# Patient Record
Sex: FEMALE | Race: NATIVE HAWAIIAN OR OTHER PACIFIC ISLANDER | NOT HISPANIC OR LATINO | Employment: OTHER | URBAN - METROPOLITAN AREA
[De-identification: names, ages, dates, MRNs, and addresses within clinical notes are randomized per-mention and may not be internally consistent; named-entity substitution may affect disease eponyms.]

---

## 2021-03-16 ENCOUNTER — OFFICE VISIT (OUTPATIENT)
Dept: FAMILY MEDICINE | Facility: CLINIC | Age: 66
End: 2021-03-16
Payer: COMMERCIAL

## 2021-03-16 VITALS
SYSTOLIC BLOOD PRESSURE: 136 MMHG | DIASTOLIC BLOOD PRESSURE: 90 MMHG | WEIGHT: 184.19 LBS | RESPIRATION RATE: 18 BRPM | HEIGHT: 63 IN | OXYGEN SATURATION: 97 % | BODY MASS INDEX: 32.64 KG/M2 | HEART RATE: 66 BPM

## 2021-03-16 DIAGNOSIS — Z11.4 ENCOUNTER FOR SCREENING FOR HIV: ICD-10-CM

## 2021-03-16 DIAGNOSIS — Z11.59 NEED FOR HEPATITIS C SCREENING TEST: ICD-10-CM

## 2021-03-16 DIAGNOSIS — L93.0 DISCOID LUPUS ERYTHEMATOSUS: ICD-10-CM

## 2021-03-16 DIAGNOSIS — E11.9 TYPE 2 DIABETES MELLITUS WITHOUT COMPLICATION, WITHOUT LONG-TERM CURRENT USE OF INSULIN: Primary | ICD-10-CM

## 2021-03-16 DIAGNOSIS — F51.01 PRIMARY INSOMNIA: ICD-10-CM

## 2021-03-16 DIAGNOSIS — N39.3 STRESS INCONTINENCE: ICD-10-CM

## 2021-03-16 PROCEDURE — 99204 OFFICE O/P NEW MOD 45 MIN: CPT | Mod: S$PBB,,, | Performed by: INTERNAL MEDICINE

## 2021-03-16 PROCEDURE — 99204 PR OFFICE/OUTPT VISIT, NEW, LEVL IV, 45-59 MIN: ICD-10-PCS | Mod: S$PBB,,, | Performed by: INTERNAL MEDICINE

## 2021-03-16 RX ORDER — METFORMIN HYDROCHLORIDE 500 MG/5ML
5 SOLUTION ORAL 2 TIMES DAILY
COMMUNITY
End: 2021-03-16 | Stop reason: SDUPTHER

## 2021-03-16 RX ORDER — CLOBETASOL PROPIONATE 0.5 MG/G
OINTMENT TOPICAL 2 TIMES DAILY
COMMUNITY
End: 2021-03-16 | Stop reason: SDUPTHER

## 2021-03-16 RX ORDER — METFORMIN HYDROCHLORIDE 500 MG/5ML
5 SOLUTION ORAL 2 TIMES DAILY
Qty: 900 ML | Refills: 3 | Status: SHIPPED | OUTPATIENT
Start: 2021-03-16 | End: 2021-03-26 | Stop reason: SDUPTHER

## 2021-03-16 RX ORDER — METFORMIN HYDROCHLORIDE 500 MG/5ML
5 SOLUTION ORAL 2 TIMES DAILY
Qty: 300 ML | Refills: 5 | Status: SHIPPED | OUTPATIENT
Start: 2021-03-16 | End: 2021-03-16 | Stop reason: SDUPTHER

## 2021-03-16 RX ORDER — CLOBETASOL PROPIONATE 0.5 MG/G
OINTMENT TOPICAL 2 TIMES DAILY
Qty: 60 G | Refills: 1 | Status: SHIPPED | OUTPATIENT
Start: 2021-03-16 | End: 2021-09-21

## 2021-03-24 LAB
ALBUMIN SERPL-MCNC: 4.8 G/DL (ref 3.8–4.8)
ALBUMIN/GLOB SERPL: 1.8 {RATIO} (ref 1.2–2.2)
ALP SERPL-CCNC: 86 IU/L (ref 39–117)
ALT SERPL-CCNC: 27 IU/L (ref 0–32)
ANA TITR SER IF: NEGATIVE {TITER}
AST SERPL-CCNC: 19 IU/L (ref 0–40)
BASOPHILS # BLD AUTO: 0 X10E3/UL (ref 0–0.2)
BASOPHILS NFR BLD AUTO: 1 %
BILIRUB SERPL-MCNC: 0.4 MG/DL (ref 0–1.2)
BUN SERPL-MCNC: 12 MG/DL (ref 8–27)
BUN/CREAT SERPL: 14 (ref 12–28)
C3 SERPL-MCNC: 180 MG/DL (ref 82–167)
C4 SERPL-MCNC: 35 MG/DL (ref 12–38)
CALCIUM SERPL-MCNC: 10.1 MG/DL (ref 8.7–10.3)
CENTROMERE B AB SER-ACNC: <0.2 AI (ref 0–0.9)
CHLORIDE SERPL-SCNC: 100 MMOL/L (ref 96–106)
CHOLEST SERPL-MCNC: 224 MG/DL (ref 100–199)
CHROMATIN AB SERPL-ACNC: <0.2 AI (ref 0–0.9)
CO2 SERPL-SCNC: 21 MMOL/L (ref 20–29)
CREAT SERPL-MCNC: 0.88 MG/DL (ref 0.57–1)
DSDNA AB SER-ACNC: <1 IU/ML (ref 0–9)
ENA JO1 AB SER-ACNC: <0.2 AI (ref 0–0.9)
ENA RNP AB SER-ACNC: <0.2 AI (ref 0–0.9)
ENA SCL70 AB SER-ACNC: <0.2 AI (ref 0–0.9)
ENA SM AB SER-ACNC: <0.2 AI (ref 0–0.9)
ENA SS-A AB SER-ACNC: <0.2 AI (ref 0–0.9)
ENA SS-B AB SER-ACNC: <0.2 AI (ref 0–0.9)
EOSINOPHIL # BLD AUTO: 0.2 X10E3/UL (ref 0–0.4)
EOSINOPHIL NFR BLD AUTO: 3 %
ERYTHROCYTE [DISTWIDTH] IN BLOOD BY AUTOMATED COUNT: 12.1 % (ref 11.7–15.4)
ERYTHROCYTE [SEDIMENTATION RATE] IN BLOOD BY WESTERGREN METHOD: 49 MM/HR (ref 0–40)
GLOBULIN SER CALC-MCNC: 2.7 G/DL (ref 1.5–4.5)
GLUCOSE SERPL-MCNC: 346 MG/DL (ref 65–99)
HBA1C MFR BLD: 13.3 % (ref 4.8–5.6)
HCT VFR BLD AUTO: 49 % (ref 34–46.6)
HCV AB S/CO SERPL IA: <0.1 S/CO RATIO (ref 0–0.9)
HDLC SERPL-MCNC: 46 MG/DL
HGB BLD-MCNC: 15.5 G/DL (ref 11.1–15.9)
HIV 1+2 AB+HIV1 P24 AG SERPL QL IA: NON REACTIVE
IMM GRANULOCYTES # BLD AUTO: 0 X10E3/UL (ref 0–0.1)
IMM GRANULOCYTES NFR BLD AUTO: 0 %
LABORATORY COMMENT REPORT: NORMAL
LDLC SERPL CALC-MCNC: 138 MG/DL (ref 0–99)
LYMPHOCYTES # BLD AUTO: 1.7 X10E3/UL (ref 0.7–3.1)
LYMPHOCYTES NFR BLD AUTO: 34 %
Lab: NORMAL
MCH RBC QN AUTO: 30.2 PG (ref 26.6–33)
MCHC RBC AUTO-ENTMCNC: 31.6 G/DL (ref 31.5–35.7)
MCV RBC AUTO: 95 FL (ref 79–97)
MONOCYTES # BLD AUTO: 0.4 X10E3/UL (ref 0.1–0.9)
MONOCYTES NFR BLD AUTO: 9 %
NEUTROPHILS # BLD AUTO: 2.7 X10E3/UL (ref 1.4–7)
NEUTROPHILS NFR BLD AUTO: 53 %
PLATELET # BLD AUTO: 214 X10E3/UL (ref 150–450)
POTASSIUM SERPL-SCNC: 4.1 MMOL/L (ref 3.5–5.2)
PROT SERPL-MCNC: 7.5 G/DL (ref 6–8.5)
RBC # BLD AUTO: 5.14 X10E6/UL (ref 3.77–5.28)
SODIUM SERPL-SCNC: 138 MMOL/L (ref 134–144)
TRIGL SERPL-MCNC: 220 MG/DL (ref 0–149)
VLDLC SERPL CALC-MCNC: 40 MG/DL (ref 5–40)
WBC # BLD AUTO: 5.1 X10E3/UL (ref 3.4–10.8)

## 2021-03-25 ENCOUNTER — TELEPHONE (OUTPATIENT)
Dept: PEDIATRICS | Facility: CLINIC | Age: 66
End: 2021-03-25

## 2021-03-26 ENCOUNTER — TELEPHONE (OUTPATIENT)
Dept: DIABETES | Facility: CLINIC | Age: 66
End: 2021-03-26

## 2021-03-26 ENCOUNTER — OFFICE VISIT (OUTPATIENT)
Dept: FAMILY MEDICINE | Facility: CLINIC | Age: 66
End: 2021-03-26
Payer: OTHER GOVERNMENT

## 2021-03-26 VITALS
HEART RATE: 90 BPM | OXYGEN SATURATION: 99 % | SYSTOLIC BLOOD PRESSURE: 132 MMHG | RESPIRATION RATE: 18 BRPM | WEIGHT: 186.19 LBS | DIASTOLIC BLOOD PRESSURE: 70 MMHG | BODY MASS INDEX: 32.99 KG/M2 | HEIGHT: 63 IN

## 2021-03-26 DIAGNOSIS — E11.9 TYPE 2 DIABETES MELLITUS WITHOUT COMPLICATION, WITHOUT LONG-TERM CURRENT USE OF INSULIN: Primary | ICD-10-CM

## 2021-03-26 DIAGNOSIS — E78.2 MIXED HYPERLIPIDEMIA: ICD-10-CM

## 2021-03-26 DIAGNOSIS — R13.10 DYSPHAGIA, UNSPECIFIED TYPE: ICD-10-CM

## 2021-03-26 DIAGNOSIS — T80.69XA ALLERGIC REACTION TO VACCINE: ICD-10-CM

## 2021-03-26 DIAGNOSIS — L93.0 DISCOID LUPUS ERYTHEMATOSUS: ICD-10-CM

## 2021-03-26 PROCEDURE — 99214 OFFICE O/P EST MOD 30 MIN: CPT | Mod: S$PBB,,, | Performed by: INTERNAL MEDICINE

## 2021-03-26 PROCEDURE — 99215 OFFICE O/P EST HI 40 MIN: CPT | Performed by: INTERNAL MEDICINE

## 2021-03-26 PROCEDURE — 99214 PR OFFICE/OUTPT VISIT, EST, LEVL IV, 30-39 MIN: ICD-10-PCS | Mod: S$PBB,,, | Performed by: INTERNAL MEDICINE

## 2021-03-26 RX ORDER — METFORMIN HYDROCHLORIDE 500 MG/5ML
10 SOLUTION ORAL 2 TIMES DAILY
Qty: 600 ML | Refills: 11 | Status: SHIPPED | OUTPATIENT
Start: 2021-03-26 | End: 2021-04-25

## 2021-03-26 RX ORDER — GLIPIZIDE 10 MG/1
10 TABLET, FILM COATED, EXTENDED RELEASE ORAL
Qty: 90 TABLET | Refills: 3 | Status: SHIPPED | OUTPATIENT
Start: 2021-03-26 | End: 2021-09-21 | Stop reason: SDUPTHER

## 2021-03-26 RX ORDER — ACETAMINOPHEN 500 MG
TABLET ORAL
COMMUNITY
Start: 2021-03-19

## 2021-04-12 ENCOUNTER — OFFICE VISIT (OUTPATIENT)
Dept: DERMATOLOGY | Facility: CLINIC | Age: 66
End: 2021-04-12
Payer: OTHER GOVERNMENT

## 2021-04-12 ENCOUNTER — TELEPHONE (OUTPATIENT)
Dept: FAMILY MEDICINE | Facility: CLINIC | Age: 66
End: 2021-04-12

## 2021-04-12 DIAGNOSIS — E11.9 TYPE 2 DIABETES MELLITUS WITHOUT COMPLICATION, WITHOUT LONG-TERM CURRENT USE OF INSULIN: Primary | ICD-10-CM

## 2021-04-12 DIAGNOSIS — L60.3 NAIL DYSTROPHY: Primary | ICD-10-CM

## 2021-04-12 DIAGNOSIS — L29.9 PRURITUS: ICD-10-CM

## 2021-04-12 PROCEDURE — 87101 SKIN FUNGI CULTURE: CPT | Performed by: STUDENT IN AN ORGANIZED HEALTH CARE EDUCATION/TRAINING PROGRAM

## 2021-04-12 PROCEDURE — 99999 PR PBB SHADOW E&M-EST. PATIENT-LVL III: CPT | Mod: PBBFAC,,, | Performed by: STUDENT IN AN ORGANIZED HEALTH CARE EDUCATION/TRAINING PROGRAM

## 2021-04-12 PROCEDURE — 99204 PR OFFICE/OUTPT VISIT, NEW, LEVL IV, 45-59 MIN: ICD-10-PCS | Mod: S$PBB,,, | Performed by: STUDENT IN AN ORGANIZED HEALTH CARE EDUCATION/TRAINING PROGRAM

## 2021-04-12 PROCEDURE — 99213 OFFICE O/P EST LOW 20 MIN: CPT | Mod: PBBFAC,PO | Performed by: STUDENT IN AN ORGANIZED HEALTH CARE EDUCATION/TRAINING PROGRAM

## 2021-04-12 PROCEDURE — 99999 PR PBB SHADOW E&M-EST. PATIENT-LVL III: ICD-10-PCS | Mod: PBBFAC,,, | Performed by: STUDENT IN AN ORGANIZED HEALTH CARE EDUCATION/TRAINING PROGRAM

## 2021-04-12 PROCEDURE — 99204 OFFICE O/P NEW MOD 45 MIN: CPT | Mod: S$PBB,,, | Performed by: STUDENT IN AN ORGANIZED HEALTH CARE EDUCATION/TRAINING PROGRAM

## 2021-04-12 PROCEDURE — 87107 FUNGI IDENTIFICATION MOLD: CPT | Performed by: STUDENT IN AN ORGANIZED HEALTH CARE EDUCATION/TRAINING PROGRAM

## 2021-04-12 RX ORDER — UREA 40 %
CREAM (GRAM) TOPICAL 2 TIMES DAILY
Qty: 198 G | Refills: 2 | Status: SHIPPED | OUTPATIENT
Start: 2021-04-12 | End: 2021-09-21

## 2021-04-12 RX ORDER — METFORMIN HYDROCHLORIDE 1000 MG/1
1000 TABLET ORAL 2 TIMES DAILY WITH MEALS
Qty: 180 TABLET | Refills: 3 | Status: SHIPPED | OUTPATIENT
Start: 2021-04-12 | End: 2021-09-21 | Stop reason: ALTCHOICE

## 2021-04-14 ENCOUNTER — CLINICAL SUPPORT (OUTPATIENT)
Dept: DIABETES | Facility: CLINIC | Age: 66
End: 2021-04-14
Payer: OTHER GOVERNMENT

## 2021-04-14 VITALS — BODY MASS INDEX: 32.96 KG/M2 | HEIGHT: 63 IN | WEIGHT: 186 LBS

## 2021-04-14 DIAGNOSIS — E11.9 TYPE 2 DIABETES MELLITUS WITHOUT COMPLICATION, WITHOUT LONG-TERM CURRENT USE OF INSULIN: ICD-10-CM

## 2021-04-14 PROCEDURE — 99212 OFFICE O/P EST SF 10 MIN: CPT | Mod: PBBFAC,PO | Performed by: DIETITIAN, REGISTERED

## 2021-04-14 PROCEDURE — 99999 PR PBB SHADOW E&M-EST. PATIENT-LVL II: CPT | Mod: PBBFAC,,, | Performed by: DIETITIAN, REGISTERED

## 2021-04-14 PROCEDURE — 99999 PR PBB SHADOW E&M-EST. PATIENT-LVL II: ICD-10-PCS | Mod: PBBFAC,,, | Performed by: DIETITIAN, REGISTERED

## 2021-04-14 PROCEDURE — G0108 DIAB MANAGE TRN  PER INDIV: HCPCS | Mod: PBBFAC,PO | Performed by: DIETITIAN, REGISTERED

## 2021-04-19 ENCOUNTER — OFFICE VISIT (OUTPATIENT)
Dept: PODIATRY | Facility: CLINIC | Age: 66
End: 2021-04-19
Payer: OTHER GOVERNMENT

## 2021-04-19 VITALS
WEIGHT: 186 LBS | BODY MASS INDEX: 32.96 KG/M2 | DIASTOLIC BLOOD PRESSURE: 83 MMHG | HEART RATE: 70 BPM | HEIGHT: 63 IN | SYSTOLIC BLOOD PRESSURE: 125 MMHG

## 2021-04-19 DIAGNOSIS — E11.9 TYPE 2 DIABETES MELLITUS WITHOUT COMPLICATION, WITHOUT LONG-TERM CURRENT USE OF INSULIN: Primary | ICD-10-CM

## 2021-04-19 DIAGNOSIS — B35.3 TINEA PEDIS OF BOTH FEET: ICD-10-CM

## 2021-04-19 DIAGNOSIS — B35.1 ONYCHOMYCOSIS DUE TO DERMATOPHYTE: ICD-10-CM

## 2021-04-19 DIAGNOSIS — L60.3 NAIL DYSTROPHY: ICD-10-CM

## 2021-04-19 PROCEDURE — 99203 PR OFFICE/OUTPT VISIT, NEW, LEVL III, 30-44 MIN: ICD-10-PCS | Mod: S$GLB,,, | Performed by: PODIATRIST

## 2021-04-19 PROCEDURE — 99203 OFFICE O/P NEW LOW 30 MIN: CPT | Mod: S$GLB,,, | Performed by: PODIATRIST

## 2021-04-19 RX ORDER — PANTOPRAZOLE SODIUM 40 MG/1
FOR SUSPENSION ORAL
COMMUNITY
Start: 2021-04-16 | End: 2021-09-21

## 2021-04-19 RX ORDER — CLOTRIMAZOLE AND BETAMETHASONE DIPROPIONATE 10; .64 MG/G; MG/G
CREAM TOPICAL 2 TIMES DAILY
Qty: 1 TUBE | Refills: 2 | Status: SHIPPED | OUTPATIENT
Start: 2021-04-19 | End: 2021-09-21

## 2021-05-04 ENCOUNTER — OFFICE VISIT (OUTPATIENT)
Dept: FAMILY MEDICINE | Facility: CLINIC | Age: 66
End: 2021-05-04
Payer: OTHER GOVERNMENT

## 2021-05-04 VITALS
SYSTOLIC BLOOD PRESSURE: 130 MMHG | WEIGHT: 189 LBS | HEIGHT: 63 IN | HEART RATE: 75 BPM | OXYGEN SATURATION: 98 % | BODY MASS INDEX: 33.49 KG/M2 | DIASTOLIC BLOOD PRESSURE: 80 MMHG

## 2021-05-04 DIAGNOSIS — Z12.31 ENCOUNTER FOR SCREENING MAMMOGRAM FOR BREAST CANCER: ICD-10-CM

## 2021-05-04 DIAGNOSIS — R13.10 DYSPHAGIA, UNSPECIFIED TYPE: ICD-10-CM

## 2021-05-04 DIAGNOSIS — E11.9 TYPE 2 DIABETES MELLITUS WITHOUT COMPLICATION, WITHOUT LONG-TERM CURRENT USE OF INSULIN: Primary | ICD-10-CM

## 2021-05-04 PROCEDURE — 99214 PR OFFICE/OUTPT VISIT, EST, LEVL IV, 30-39 MIN: ICD-10-PCS | Mod: S$PBB,,, | Performed by: INTERNAL MEDICINE

## 2021-05-04 PROCEDURE — 99215 OFFICE O/P EST HI 40 MIN: CPT | Performed by: INTERNAL MEDICINE

## 2021-05-04 PROCEDURE — 99214 OFFICE O/P EST MOD 30 MIN: CPT | Mod: S$PBB,,, | Performed by: INTERNAL MEDICINE

## 2021-05-10 ENCOUNTER — HOSPITAL ENCOUNTER (OUTPATIENT)
Dept: RADIOLOGY | Facility: HOSPITAL | Age: 66
Discharge: HOME | End: 2021-05-10
Attending: INTERNAL MEDICINE
Payer: OTHER GOVERNMENT

## 2021-05-10 DIAGNOSIS — Z12.31 ENCOUNTER FOR SCREENING MAMMOGRAM FOR BREAST CANCER: ICD-10-CM

## 2021-05-10 PROCEDURE — 77067 SCR MAMMO BI INCL CAD: CPT | Mod: TC,PO

## 2021-05-11 ENCOUNTER — TELEPHONE (OUTPATIENT)
Dept: DERMATOLOGY | Facility: CLINIC | Age: 66
End: 2021-05-11

## 2021-05-11 LAB — FUNGUS BLD CULT: NORMAL

## 2021-05-12 ENCOUNTER — TELEPHONE (OUTPATIENT)
Dept: FAMILY MEDICINE | Facility: CLINIC | Age: 66
End: 2021-05-12

## 2021-06-18 ENCOUNTER — OFFICE VISIT (OUTPATIENT)
Dept: FAMILY MEDICINE | Facility: CLINIC | Age: 66
End: 2021-06-18
Payer: OTHER GOVERNMENT

## 2021-06-18 VITALS
BODY MASS INDEX: 33.75 KG/M2 | DIASTOLIC BLOOD PRESSURE: 78 MMHG | HEART RATE: 80 BPM | OXYGEN SATURATION: 96 % | SYSTOLIC BLOOD PRESSURE: 126 MMHG | WEIGHT: 190.5 LBS

## 2021-06-18 DIAGNOSIS — E11.9 TYPE 2 DIABETES MELLITUS WITHOUT COMPLICATION, WITHOUT LONG-TERM CURRENT USE OF INSULIN: Primary | ICD-10-CM

## 2021-06-18 DIAGNOSIS — R09.82 POST-NASAL DRIP: ICD-10-CM

## 2021-06-18 DIAGNOSIS — G47.30 SLEEP APNEA, UNSPECIFIED TYPE: ICD-10-CM

## 2021-06-18 LAB — HBA1C MFR BLD: 8.4 %

## 2021-06-18 PROCEDURE — 99214 PR OFFICE/OUTPT VISIT, EST, LEVL IV, 30-39 MIN: ICD-10-PCS | Mod: S$PBB,,, | Performed by: INTERNAL MEDICINE

## 2021-06-18 PROCEDURE — 99214 OFFICE O/P EST MOD 30 MIN: CPT | Mod: S$PBB,,, | Performed by: INTERNAL MEDICINE

## 2021-06-18 PROCEDURE — 83036 HEMOGLOBIN GLYCOSYLATED A1C: CPT | Mod: PBBFAC | Performed by: INTERNAL MEDICINE

## 2021-06-18 PROCEDURE — 99214 OFFICE O/P EST MOD 30 MIN: CPT | Performed by: INTERNAL MEDICINE

## 2021-06-18 RX ORDER — IBUPROFEN 200 MG
200 TABLET ORAL EVERY 6 HOURS PRN
COMMUNITY
End: 2021-09-21

## 2021-06-18 RX ORDER — FLUTICASONE PROPIONATE 50 MCG
1 SPRAY, SUSPENSION (ML) NASAL DAILY
Qty: 48 G | Refills: 3 | Status: SHIPPED | OUTPATIENT
Start: 2021-06-18 | End: 2021-09-21

## 2021-07-16 ENCOUNTER — OFFICE VISIT (OUTPATIENT)
Dept: PULMONOLOGY | Facility: CLINIC | Age: 66
End: 2021-07-16
Payer: OTHER GOVERNMENT

## 2021-07-16 VITALS
OXYGEN SATURATION: 99 % | SYSTOLIC BLOOD PRESSURE: 124 MMHG | BODY MASS INDEX: 33.66 KG/M2 | DIASTOLIC BLOOD PRESSURE: 79 MMHG | HEART RATE: 76 BPM | HEIGHT: 63 IN | WEIGHT: 190 LBS

## 2021-07-16 DIAGNOSIS — J30.9 ALLERGIC RHINITIS, UNSPECIFIED SEASONALITY, UNSPECIFIED TRIGGER: ICD-10-CM

## 2021-07-16 DIAGNOSIS — G47.30 SLEEP APNEA, UNSPECIFIED TYPE: ICD-10-CM

## 2021-07-16 DIAGNOSIS — R53.82 CHRONIC FATIGUE: Primary | ICD-10-CM

## 2021-07-16 PROCEDURE — 99214 PR OFFICE/OUTPT VISIT, EST, LEVL IV, 30-39 MIN: ICD-10-PCS | Mod: S$GLB,,, | Performed by: NURSE PRACTITIONER

## 2021-07-16 PROCEDURE — 99214 OFFICE O/P EST MOD 30 MIN: CPT | Mod: S$GLB,,, | Performed by: NURSE PRACTITIONER

## 2021-08-12 ENCOUNTER — PROCEDURE VISIT (OUTPATIENT)
Dept: SLEEP MEDICINE | Facility: HOSPITAL | Age: 66
End: 2021-08-12
Payer: OTHER GOVERNMENT

## 2021-08-12 DIAGNOSIS — G47.30 SLEEP APNEA, UNSPECIFIED TYPE: ICD-10-CM

## 2021-08-12 DIAGNOSIS — R53.82 CHRONIC FATIGUE: ICD-10-CM

## 2021-08-12 PROCEDURE — 95810 POLYSOM 6/> YRS 4/> PARAM: CPT

## 2021-08-17 ENCOUNTER — TELEPHONE (OUTPATIENT)
Dept: PULMONOLOGY | Facility: CLINIC | Age: 66
End: 2021-08-17

## 2021-08-17 DIAGNOSIS — G47.33 OSA (OBSTRUCTIVE SLEEP APNEA): Primary | ICD-10-CM

## 2021-09-21 ENCOUNTER — OFFICE VISIT (OUTPATIENT)
Dept: FAMILY MEDICINE | Facility: CLINIC | Age: 66
End: 2021-09-21
Payer: OTHER GOVERNMENT

## 2021-09-21 VITALS
TEMPERATURE: 98 F | HEIGHT: 63 IN | RESPIRATION RATE: 18 BRPM | WEIGHT: 195 LBS | OXYGEN SATURATION: 98 % | SYSTOLIC BLOOD PRESSURE: 128 MMHG | DIASTOLIC BLOOD PRESSURE: 70 MMHG | BODY MASS INDEX: 34.55 KG/M2 | HEART RATE: 74 BPM

## 2021-09-21 DIAGNOSIS — E11.9 TYPE 2 DIABETES MELLITUS WITHOUT COMPLICATION, WITHOUT LONG-TERM CURRENT USE OF INSULIN: Primary | ICD-10-CM

## 2021-09-21 DIAGNOSIS — E78.5 DYSLIPIDEMIA: ICD-10-CM

## 2021-09-21 LAB
CLARITY UR: CLEAR
COLOR UR: NORMAL
CREATINE/CREATININE: NORMAL
HBA1C MFR BLD: 7.5 %
Lab: NORMAL
POC MICROALBUMIN URINE: NEGATIVE

## 2021-09-21 PROCEDURE — 99213 OFFICE O/P EST LOW 20 MIN: CPT | Mod: S$PBB,,, | Performed by: FAMILY MEDICINE

## 2021-09-21 PROCEDURE — 99214 OFFICE O/P EST MOD 30 MIN: CPT | Performed by: FAMILY MEDICINE

## 2021-09-21 PROCEDURE — 82044 UR ALBUMIN SEMIQUANTITATIVE: CPT | Mod: PBBFAC | Performed by: FAMILY MEDICINE

## 2021-09-21 PROCEDURE — 99213 PR OFFICE/OUTPT VISIT, EST, LEVL III, 20-29 MIN: ICD-10-PCS | Mod: S$PBB,,, | Performed by: FAMILY MEDICINE

## 2021-09-21 PROCEDURE — 83036 HEMOGLOBIN GLYCOSYLATED A1C: CPT | Mod: PBBFAC | Performed by: FAMILY MEDICINE

## 2021-09-21 RX ORDER — NAPROXEN SODIUM 220 MG/1
81 TABLET, FILM COATED ORAL DAILY
Qty: 90 TABLET | Refills: 3 | Status: SHIPPED | OUTPATIENT
Start: 2021-09-21 | End: 2022-09-27

## 2021-09-21 RX ORDER — ATORVASTATIN CALCIUM 20 MG/1
40 TABLET, FILM COATED ORAL DAILY
Qty: 180 TABLET | Refills: 3 | Status: SHIPPED | OUTPATIENT
Start: 2021-09-21 | End: 2022-03-22 | Stop reason: SDUPTHER

## 2021-09-21 RX ORDER — GLIPIZIDE 10 MG/1
10 TABLET, FILM COATED, EXTENDED RELEASE ORAL
Qty: 90 TABLET | Refills: 3 | Status: SHIPPED | OUTPATIENT
Start: 2021-09-21 | End: 2022-03-22 | Stop reason: SDUPTHER

## 2021-09-24 ENCOUNTER — TELEPHONE (OUTPATIENT)
Dept: PULMONOLOGY | Facility: CLINIC | Age: 66
End: 2021-09-24

## 2021-10-21 ENCOUNTER — OFFICE VISIT (OUTPATIENT)
Dept: FAMILY MEDICINE | Facility: CLINIC | Age: 66
End: 2021-10-21
Payer: OTHER GOVERNMENT

## 2021-10-21 VITALS
BODY MASS INDEX: 34.55 KG/M2 | WEIGHT: 195 LBS | DIASTOLIC BLOOD PRESSURE: 70 MMHG | SYSTOLIC BLOOD PRESSURE: 136 MMHG | HEART RATE: 74 BPM | HEIGHT: 63 IN | RESPIRATION RATE: 18 BRPM | OXYGEN SATURATION: 97 %

## 2021-10-21 DIAGNOSIS — E78.2 MIXED HYPERLIPIDEMIA: ICD-10-CM

## 2021-10-21 DIAGNOSIS — E11.9 TYPE 2 DIABETES MELLITUS WITHOUT COMPLICATION, WITHOUT LONG-TERM CURRENT USE OF INSULIN: Primary | ICD-10-CM

## 2021-10-21 DIAGNOSIS — R21 RASH AND NONSPECIFIC SKIN ERUPTION: ICD-10-CM

## 2021-10-21 PROCEDURE — 99213 PR OFFICE/OUTPT VISIT, EST, LEVL III, 20-29 MIN: ICD-10-PCS | Mod: S$PBB,,, | Performed by: FAMILY MEDICINE

## 2021-10-21 PROCEDURE — 99213 OFFICE O/P EST LOW 20 MIN: CPT | Mod: S$PBB,,, | Performed by: FAMILY MEDICINE

## 2021-10-21 PROCEDURE — 99214 OFFICE O/P EST MOD 30 MIN: CPT | Performed by: FAMILY MEDICINE

## 2021-10-21 RX ORDER — PANTOPRAZOLE SODIUM 40 MG/1
FOR SUSPENSION ORAL
COMMUNITY
Start: 2021-10-12

## 2021-10-21 RX ORDER — UREA 40 %
CREAM (GRAM) TOPICAL
COMMUNITY
Start: 2021-09-30

## 2021-11-04 ENCOUNTER — OFFICE VISIT (OUTPATIENT)
Dept: FAMILY MEDICINE | Facility: CLINIC | Age: 66
End: 2021-11-04
Payer: OTHER GOVERNMENT

## 2021-11-04 VITALS
BODY MASS INDEX: 34.38 KG/M2 | DIASTOLIC BLOOD PRESSURE: 74 MMHG | WEIGHT: 194 LBS | SYSTOLIC BLOOD PRESSURE: 122 MMHG | HEIGHT: 63 IN | OXYGEN SATURATION: 98 % | RESPIRATION RATE: 18 BRPM | HEART RATE: 68 BPM

## 2021-11-04 DIAGNOSIS — E11.9 TYPE 2 DIABETES MELLITUS WITHOUT COMPLICATION, WITHOUT LONG-TERM CURRENT USE OF INSULIN: ICD-10-CM

## 2021-11-04 DIAGNOSIS — T78.40XD ALLERGIC REACTION, SUBSEQUENT ENCOUNTER: ICD-10-CM

## 2021-11-04 DIAGNOSIS — R21 RASH: Primary | ICD-10-CM

## 2021-11-04 PROBLEM — T78.40XA ALLERGIC REACTION: Status: ACTIVE | Noted: 2021-11-04

## 2021-11-04 PROCEDURE — 99214 OFFICE O/P EST MOD 30 MIN: CPT | Performed by: FAMILY MEDICINE

## 2021-11-04 PROCEDURE — 99213 OFFICE O/P EST LOW 20 MIN: CPT | Mod: S$PBB,,, | Performed by: FAMILY MEDICINE

## 2021-11-04 PROCEDURE — 99213 PR OFFICE/OUTPT VISIT, EST, LEVL III, 20-29 MIN: ICD-10-PCS | Mod: S$PBB,,, | Performed by: FAMILY MEDICINE

## 2021-11-04 RX ORDER — EPINEPHRINE 0.3 MG/.3ML
INJECTION SUBCUTANEOUS
Qty: 1 EACH | Refills: 2 | Status: SHIPPED | OUTPATIENT
Start: 2021-11-04 | End: 2024-01-02 | Stop reason: SDUPTHER

## 2021-12-02 ENCOUNTER — OFFICE VISIT (OUTPATIENT)
Dept: FAMILY MEDICINE | Facility: CLINIC | Age: 66
End: 2021-12-02
Payer: OTHER GOVERNMENT

## 2021-12-02 DIAGNOSIS — H65.193 ACUTE MUCOID OTITIS MEDIA OF BOTH EARS: ICD-10-CM

## 2021-12-02 DIAGNOSIS — R09.81 HEAD CONGESTION: ICD-10-CM

## 2021-12-02 DIAGNOSIS — J02.9 SORE THROAT: ICD-10-CM

## 2021-12-02 DIAGNOSIS — R51.9 ACUTE NONINTRACTABLE HEADACHE, UNSPECIFIED HEADACHE TYPE: ICD-10-CM

## 2021-12-02 DIAGNOSIS — H65.193 ACUTE MUCOID OTITIS MEDIA OF BOTH EARS: Primary | ICD-10-CM

## 2021-12-02 LAB
CTP QC/QA: YES
FLUAV AG NPH QL: NEGATIVE
FLUBV AG NPH QL: NEGATIVE
S PYO RRNA THROAT QL PROBE: NEGATIVE
SARS-COV-2 RDRP RESP QL NAA+PROBE: NEGATIVE

## 2021-12-02 PROCEDURE — 99214 PR OFFICE/OUTPT VISIT, EST, LEVL IV, 30-39 MIN: ICD-10-PCS | Mod: S$PBB,,, | Performed by: NURSE PRACTITIONER

## 2021-12-02 PROCEDURE — 99215 OFFICE O/P EST HI 40 MIN: CPT | Performed by: NURSE PRACTITIONER

## 2021-12-02 PROCEDURE — U0002 COVID-19 LAB TEST NON-CDC: HCPCS | Mod: PBBFAC | Performed by: NURSE PRACTITIONER

## 2021-12-02 PROCEDURE — 99214 OFFICE O/P EST MOD 30 MIN: CPT | Mod: S$PBB,,, | Performed by: NURSE PRACTITIONER

## 2021-12-02 PROCEDURE — 96372 THER/PROPH/DIAG INJ SC/IM: CPT | Mod: PBBFAC | Performed by: NURSE PRACTITIONER

## 2021-12-02 PROCEDURE — 87804 INFLUENZA ASSAY W/OPTIC: CPT | Mod: PBBFAC | Performed by: NURSE PRACTITIONER

## 2021-12-02 PROCEDURE — 87880 STREP A ASSAY W/OPTIC: CPT | Mod: PBBFAC | Performed by: NURSE PRACTITIONER

## 2021-12-02 RX ORDER — NAPROXEN 500 MG/1
500 TABLET ORAL 2 TIMES DAILY WITH MEALS
Qty: 40 TABLET | Refills: 1 | Status: SHIPPED | OUTPATIENT
Start: 2021-12-02 | End: 2021-12-07

## 2021-12-02 RX ORDER — FLUTICASONE PROPIONATE 50 MCG
2 SPRAY, SUSPENSION (ML) NASAL DAILY
Qty: 13 G | Refills: 1 | Status: SHIPPED | OUTPATIENT
Start: 2021-12-02 | End: 2021-12-02 | Stop reason: SDUPTHER

## 2021-12-02 RX ORDER — DEXAMETHASONE SODIUM PHOSPHATE 4 MG/ML
8 INJECTION, SOLUTION INTRA-ARTICULAR; INTRALESIONAL; INTRAMUSCULAR; INTRAVENOUS; SOFT TISSUE ONCE
Status: COMPLETED | OUTPATIENT
Start: 2021-12-02 | End: 2021-12-02

## 2021-12-02 RX ORDER — AMOXICILLIN AND CLAVULANATE POTASSIUM 875; 125 MG/1; MG/1
1 TABLET, FILM COATED ORAL EVERY 12 HOURS
Qty: 20 TABLET | Refills: 0 | Status: SHIPPED | OUTPATIENT
Start: 2021-12-02 | End: 2022-03-22

## 2021-12-02 RX ADMIN — DEXAMETHASONE SODIUM PHOSPHATE 8 MG: 4 INJECTION, SOLUTION INTRA-ARTICULAR; INTRALESIONAL; INTRAMUSCULAR; INTRAVENOUS; SOFT TISSUE at 12:12

## 2021-12-03 VITALS
DIASTOLIC BLOOD PRESSURE: 80 MMHG | HEIGHT: 63 IN | RESPIRATION RATE: 18 BRPM | SYSTOLIC BLOOD PRESSURE: 134 MMHG | OXYGEN SATURATION: 97 % | HEART RATE: 64 BPM | WEIGHT: 193.31 LBS | BODY MASS INDEX: 34.25 KG/M2 | TEMPERATURE: 98 F

## 2021-12-03 PROBLEM — H65.193 ACUTE MUCOID OTITIS MEDIA OF BOTH EARS: Status: ACTIVE | Noted: 2021-12-03

## 2021-12-03 PROBLEM — R09.81 HEAD CONGESTION: Status: ACTIVE | Noted: 2021-12-03

## 2021-12-03 PROBLEM — R51.9 ACUTE NONINTRACTABLE HEADACHE: Status: ACTIVE | Noted: 2021-12-03

## 2021-12-03 PROBLEM — H65.113 ACUTE MUCOID OTITIS MEDIA OF BOTH EARS: Status: ACTIVE | Noted: 2021-12-03

## 2021-12-03 PROBLEM — J02.9 SORE THROAT: Status: ACTIVE | Noted: 2021-12-03

## 2021-12-03 RX ORDER — FLUTICASONE PROPIONATE 50 MCG
2 SPRAY, SUSPENSION (ML) NASAL DAILY
Qty: 13 G | Refills: 1 | Status: SHIPPED | OUTPATIENT
Start: 2021-12-03 | End: 2021-12-07

## 2021-12-13 ENCOUNTER — OFFICE VISIT (OUTPATIENT)
Dept: ALLERGY | Facility: CLINIC | Age: 66
End: 2021-12-13
Payer: OTHER GOVERNMENT

## 2021-12-13 ENCOUNTER — LAB VISIT (OUTPATIENT)
Dept: LAB | Facility: HOSPITAL | Age: 66
End: 2021-12-13
Attending: ALLERGY & IMMUNOLOGY
Payer: OTHER GOVERNMENT

## 2021-12-13 VITALS
DIASTOLIC BLOOD PRESSURE: 84 MMHG | BODY MASS INDEX: 34.38 KG/M2 | HEIGHT: 63 IN | OXYGEN SATURATION: 98 % | WEIGHT: 194 LBS | HEART RATE: 88 BPM | SYSTOLIC BLOOD PRESSURE: 170 MMHG | TEMPERATURE: 98 F

## 2021-12-13 DIAGNOSIS — T78.3XXA ANGIOEDEMA, INITIAL ENCOUNTER: ICD-10-CM

## 2021-12-13 DIAGNOSIS — E11.9 TYPE 2 DIABETES MELLITUS WITHOUT COMPLICATION, WITHOUT LONG-TERM CURRENT USE OF INSULIN: ICD-10-CM

## 2021-12-13 DIAGNOSIS — R21 RASH AND NONSPECIFIC SKIN ERUPTION: ICD-10-CM

## 2021-12-13 DIAGNOSIS — R21 RASH AND NONSPECIFIC SKIN ERUPTION: Primary | ICD-10-CM

## 2021-12-13 LAB — CRP SERPL-MCNC: 0.16 MG/DL

## 2021-12-13 PROCEDURE — 86140 C-REACTIVE PROTEIN: CPT | Performed by: ALLERGY & IMMUNOLOGY

## 2021-12-13 PROCEDURE — 86003 ALLG SPEC IGE CRUDE XTRC EA: CPT | Mod: 91 | Performed by: ALLERGY & IMMUNOLOGY

## 2021-12-13 PROCEDURE — 86003 ALLG SPEC IGE CRUDE XTRC EA: CPT | Mod: 59 | Performed by: ALLERGY & IMMUNOLOGY

## 2021-12-13 PROCEDURE — 30000890 LABCORP MISCELLANEOUS TEST: Mod: 91 | Performed by: ALLERGY & IMMUNOLOGY

## 2021-12-13 PROCEDURE — 86003 ALLG SPEC IGE CRUDE XTRC EA: CPT | Performed by: ALLERGY & IMMUNOLOGY

## 2021-12-13 PROCEDURE — 99204 PR OFFICE/OUTPT VISIT, NEW, LEVL IV, 45-59 MIN: ICD-10-PCS | Mod: S$GLB,,, | Performed by: ALLERGY & IMMUNOLOGY

## 2021-12-13 PROCEDURE — 36415 COLL VENOUS BLD VENIPUNCTURE: CPT | Performed by: ALLERGY & IMMUNOLOGY

## 2021-12-13 PROCEDURE — 86332 IMMUNE COMPLEX ASSAY: CPT | Mod: 91 | Performed by: ALLERGY & IMMUNOLOGY

## 2021-12-13 PROCEDURE — 99204 OFFICE O/P NEW MOD 45 MIN: CPT | Mod: S$GLB,,, | Performed by: ALLERGY & IMMUNOLOGY

## 2021-12-13 RX ORDER — HYDROCORTISONE 25 MG/G
CREAM TOPICAL 2 TIMES DAILY
Qty: 453 G | Refills: 1 | Status: SHIPPED | OUTPATIENT
Start: 2021-12-13

## 2021-12-13 RX ORDER — CETIRIZINE HYDROCHLORIDE 10 MG/1
10 TABLET ORAL 2 TIMES DAILY
Qty: 60 TABLET | Refills: 2 | Status: SHIPPED | OUTPATIENT
Start: 2021-12-13 | End: 2022-01-24 | Stop reason: SDUPTHER

## 2021-12-13 RX ORDER — FAMOTIDINE 20 MG/1
20 TABLET, FILM COATED ORAL 2 TIMES DAILY
Qty: 60 TABLET | Refills: 3 | Status: SHIPPED | OUTPATIENT
Start: 2021-12-13 | End: 2022-01-24 | Stop reason: SDUPTHER

## 2021-12-16 LAB
LABCORP MISC TEST CODE: NORMAL
LABCORP MISC TEST NAME: NORMAL
LABCORP MISCELLANEOUS TEST: NORMAL
SOYBEAN IGE QN: <0.1 KU/L
WHEAT IGE QN: <0.1 KU/L
WHEAT IGE QN: <0.1 KU/L

## 2021-12-18 LAB
LABCORP MISC TEST CODE: NORMAL
LABCORP MISC TEST CODE: NORMAL
LABCORP MISC TEST NAME: NORMAL
LABCORP MISC TEST NAME: NORMAL
LABCORP MISCELLANEOUS TEST: NORMAL
LABCORP MISCELLANEOUS TEST: NORMAL

## 2021-12-23 ENCOUNTER — OFFICE VISIT (OUTPATIENT)
Dept: FAMILY MEDICINE | Facility: CLINIC | Age: 66
End: 2021-12-23
Payer: OTHER GOVERNMENT

## 2021-12-23 VITALS
DIASTOLIC BLOOD PRESSURE: 80 MMHG | SYSTOLIC BLOOD PRESSURE: 130 MMHG | RESPIRATION RATE: 18 BRPM | OXYGEN SATURATION: 98 % | BODY MASS INDEX: 34.02 KG/M2 | HEIGHT: 63 IN | HEART RATE: 88 BPM | WEIGHT: 192 LBS

## 2021-12-23 DIAGNOSIS — T78.3XXD ANGIOEDEMA, SUBSEQUENT ENCOUNTER: ICD-10-CM

## 2021-12-23 DIAGNOSIS — E11.9 TYPE 2 DIABETES MELLITUS WITHOUT COMPLICATION, WITHOUT LONG-TERM CURRENT USE OF INSULIN: Primary | ICD-10-CM

## 2021-12-23 DIAGNOSIS — E78.2 MIXED HYPERLIPIDEMIA: ICD-10-CM

## 2021-12-23 LAB — HBA1C MFR BLD: 10 %

## 2021-12-23 PROCEDURE — 99213 PR OFFICE/OUTPT VISIT, EST, LEVL III, 20-29 MIN: ICD-10-PCS | Mod: S$PBB,,, | Performed by: FAMILY MEDICINE

## 2021-12-23 PROCEDURE — 99215 OFFICE O/P EST HI 40 MIN: CPT | Performed by: FAMILY MEDICINE

## 2021-12-23 PROCEDURE — 99213 OFFICE O/P EST LOW 20 MIN: CPT | Mod: S$PBB,,, | Performed by: FAMILY MEDICINE

## 2021-12-23 PROCEDURE — 83036 HEMOGLOBIN GLYCOSYLATED A1C: CPT | Mod: PBBFAC | Performed by: FAMILY MEDICINE

## 2021-12-23 RX ORDER — SEMAGLUTIDE 1.34 MG/ML
0.5 INJECTION, SOLUTION SUBCUTANEOUS
Qty: 3 PEN | Refills: 3 | Status: SHIPPED | OUTPATIENT
Start: 2021-12-23 | End: 2022-03-15 | Stop reason: SDUPTHER

## 2021-12-28 ENCOUNTER — TELEPHONE (OUTPATIENT)
Dept: FAMILY MEDICINE | Facility: CLINIC | Age: 66
End: 2021-12-28
Payer: COMMERCIAL

## 2022-01-24 ENCOUNTER — OFFICE VISIT (OUTPATIENT)
Dept: ALLERGY | Facility: CLINIC | Age: 67
End: 2022-01-24
Payer: OTHER GOVERNMENT

## 2022-01-24 VITALS
OXYGEN SATURATION: 98 % | WEIGHT: 188 LBS | BODY MASS INDEX: 33.31 KG/M2 | SYSTOLIC BLOOD PRESSURE: 126 MMHG | HEIGHT: 63 IN | TEMPERATURE: 97 F | DIASTOLIC BLOOD PRESSURE: 78 MMHG | HEART RATE: 72 BPM

## 2022-01-24 DIAGNOSIS — T78.3XXA ANGIOEDEMA, INITIAL ENCOUNTER: ICD-10-CM

## 2022-01-24 DIAGNOSIS — L29.9 PRURITUS: ICD-10-CM

## 2022-01-24 DIAGNOSIS — E11.9 TYPE 2 DIABETES MELLITUS WITHOUT COMPLICATION, WITHOUT LONG-TERM CURRENT USE OF INSULIN: ICD-10-CM

## 2022-01-24 DIAGNOSIS — R21 RASH AND NONSPECIFIC SKIN ERUPTION: Primary | ICD-10-CM

## 2022-01-24 PROCEDURE — 99214 OFFICE O/P EST MOD 30 MIN: CPT | Mod: S$GLB,,, | Performed by: ALLERGY & IMMUNOLOGY

## 2022-01-24 PROCEDURE — 99214 PR OFFICE/OUTPT VISIT, EST, LEVL IV, 30-39 MIN: ICD-10-PCS | Mod: S$GLB,,, | Performed by: ALLERGY & IMMUNOLOGY

## 2022-01-24 RX ORDER — FAMOTIDINE 20 MG/1
20 TABLET, FILM COATED ORAL 2 TIMES DAILY
Qty: 180 TABLET | Refills: 3 | Status: SHIPPED | OUTPATIENT
Start: 2022-01-24 | End: 2022-11-11 | Stop reason: SDUPTHER

## 2022-01-24 RX ORDER — CAMPHOR AND MENTHOL 5; 5 MG/ML; MG/ML
LOTION TOPICAL
Qty: 222 ML | Refills: 3 | Status: SHIPPED | OUTPATIENT
Start: 2022-01-24

## 2022-01-24 RX ORDER — CETIRIZINE HYDROCHLORIDE 10 MG/1
10 TABLET ORAL 2 TIMES DAILY
Qty: 180 TABLET | Refills: 3 | Status: SHIPPED | OUTPATIENT
Start: 2022-01-24 | End: 2022-11-11 | Stop reason: SDUPTHER

## 2022-01-24 NOTE — PATIENT INSTRUCTIONS
Look up emmanuelle, cashew urschiol similarity to poison ivy.     Continue to take the cetirizine 1 pill twice per day     You may consider taking 2 pills twice per day on days of spicy foods and shellfish.     You can take a max of zyrtec or cetirizine 4 pills per day.     In addition to 2 pills of pepcid or famotidine per day.       Follow up 6 months , sooner if needed.

## 2022-01-24 NOTE — PROGRESS NOTES
"Subjective:       Patient ID: Lidya Benoit is a 66 y.o. female.    Chief Complaint: Angioedema (No incidents since last visit, had labs done. Yesterday after eating vazquez felt some tingling, but not as bad as before. )    HPI     Pt presents today for rash.       Her rash is improved.     However, when she made spicy chicken vazquez- relates the "heat" spicy.     Rash   Condition: improved , itching improved.   Onset 10 years ago - occurred prior to her current medication.   Again in 11/2021  Again 4 days ago   Sx: itching is improved on BID H1   erythematous rash , "whole body tingling"   Face, bilateral wrists, lateral legs, back.   "unbearable itching"   Hyperesthesia.   Spares palms and soles  Duration: few days   asso : lip swelling, facial swelling.   Tx: H1 BID.   coffee , ceravae, benadryl.  Benadryl may help but she will have a HA.   She has an epi pen but has never used it.     Augmentin given early 12/2021 for otitis media.     Pt is suspicious of foods such as soy, garlic, tomato, asian food- olga.   No one food is consistent.     Pt states she doesn't have any problems with milk or wheat.     This may be a frquency of 1 time per week.      PMH:  DM NIDDM-2019 onset.     Fhx:  Neg atopic history     12/2021   [id number]-AlQ Olga   H348-YuT Olga                <0.10                J714-GcH Garlic   N448-JwJ Garlic                <0.10                Immune Complexes, C1q Binding  <1.2             ug Eq/mL BN  (negative)    Component      Latest Ref Rng & Units 12/13/2021 12/13/2021           2:42 PM  2:42 PM   Soybean IgE      Class 0 kU/L  <0.10   Wheat IgE      Class 0 kU/L <0.10 <0.10   CRP      <0.76 mg/dL  0.16       Component      Latest Ref Rng & Units 3/23/2021   Sed Rate      0 - 40 mm/hr 49 (H)      Component      Latest Ref Rng & Units 3/23/2021   C3 Complement      82 - 167 mg/dL 180 (H)   C4 Complement      12 - 38 mg/dL 35     Component      Latest Ref Rng & Units 3/23/2021   WBC      3.4 - " "10.8 x10E3/uL 5.1   RBC      3.77 - 5.28 x10E6/uL 5.14   Hemoglobin      11.1 - 15.9 g/dL 15.5   Hematocrit      34.0 - 46.6 % 49.0 (H)   MCV      79 - 97 fL 95   MCH      26.6 - 33.0 pg 30.2   MCHC      31.5 - 35.7 g/dL 31.6   RDW      11.7 - 15.4 % 12.1   Platelets      150 - 450 x10E3/uL 214   Neutrophils      Not Estab. % 53   Lymph %      Not Estab. % 34   Mono %      Not Estab. % 9   Eosinophil %      Not Estab. % 3   Basophil %      Not Estab. % 1   Neutrophils, Abs      1.4 - 7.0 x10E3/uL 2.7   Lymph #      0.7 - 3.1 x10E3/uL 1.7   Mono #      0.1 - 0.9 x10E3/uL 0.4   Eos #      0.0 - 0.4 x10E3/uL 0.2   Baso #      0.0 - 0.2 x10E3/uL 0.0   Immature Granulocytes      Not Estab. % 0   Immature Grans (Abs)      0.0 - 0.1 x10E3/uL 0.0       Review of Systems    General: neg unexpected weight changes, fevers, chills, night sweats, malaise  HEENT: see hpi, Neg eye pain, vision changes, ear drainage, nose bleeds, throat tightness, sores in the mouth  CV: Neg chest pain, palpitations, swelling  Resp: see hpi, neg shortness of breath, hemoptysis, cough  GI: see hpi, neg dysphagia, night abdominal pain, reflux, chronic diarrhea, chronic constipation  Derm: See Hpi, neg new rash, neg flushing  Mu/sk: Neg joint pain, joint swelling   Psych: Neg anxiety  neuro: neg chronic headaches, muscle weakness  Endo: neg heat/cold intolerance, chronic fatigue    Objective:     Vitals:    01/24/22 0818   BP: 126/78   Pulse: 72   Temp: 97.2 °F (36.2 °C)   SpO2: 98%   Weight: 85.3 kg (188 lb)   Height: 5' 3" (1.6 m)        Physical Exam    General: no acute distress, well developed well nourished   HEENT:   Head:normocephalic atraumatic  Eyes: KATELYN, EOMI, Neg injection, scleral icterus, or conjunctival papillary hypertrophy.  Skin: improved erythema, less swelling in the face. erythematous macular rash on bilateral cheeks, lateral calf, right wrist. Mild swelling under the eyes, and cheeks.   Lymph: neg supraclavicular, axillary "     Assessment:       1. Rash and nonspecific skin eruption    2. Angioedema, initial encounter    3. Type 2 diabetes mellitus without complication, without long-term current use of insulin    4. Pruritus        Plan:       Rash and nonspecific skin eruption  -     cetirizine (ZYRTEC) 10 MG tablet; Take 1 tablet (10 mg total) by mouth 2 (two) times a day.  Dispense: 180 tablet; Refill: 3    Angioedema, initial encounter  -     cetirizine (ZYRTEC) 10 MG tablet; Take 1 tablet (10 mg total) by mouth 2 (two) times a day.  Dispense: 180 tablet; Refill: 3    Type 2 diabetes mellitus without complication, without long-term current use of insulin    Pruritus  -     camphor-menthol 0.5-0.5% (SARNA ORIGINAL) lotion; Apply topically as needed.  Dispense: 222 mL; Refill: 3            Rash   1/22   reveiwed all labs and negative for acquired angioedema and food sensitivity   cotninued hydrocortisone 2.5 %  Discussed low histamine diet  Increase to 2 h1 bid if needed  Continue h2 bid     counselled about triggers and triggers.     12/2021  Start high dose antihistamines   H1, H2  Topical steroids prn   hydrocoritsone 2.5 % prn BID    C1q binding assay to eval for angioedema    Diabetes non insulin dependence   Use steroid judiciously due to DM     Follow up in 6 months, sooner if needed.         Nicolasa Gruber M.D.  Allergy/Immunology  [facility]   [phone] phone  [phone] fax

## 2022-03-15 DIAGNOSIS — E11.9 TYPE 2 DIABETES MELLITUS WITHOUT COMPLICATION, WITHOUT LONG-TERM CURRENT USE OF INSULIN: ICD-10-CM

## 2022-03-15 RX ORDER — SEMAGLUTIDE 1.34 MG/ML
0.5 INJECTION, SOLUTION SUBCUTANEOUS
Qty: 3 PEN | Refills: 3 | Status: SHIPPED | OUTPATIENT
Start: 2022-03-15 | End: 2022-03-22 | Stop reason: SDUPTHER

## 2022-03-21 PROBLEM — R13.10 DYSPHAGIA: Status: RESOLVED | Noted: 2021-03-26 | Resolved: 2022-03-21

## 2022-03-21 PROBLEM — J02.9 SORE THROAT: Status: RESOLVED | Noted: 2021-12-03 | Resolved: 2022-03-21

## 2022-03-21 PROBLEM — T78.40XA ALLERGIC REACTION: Status: RESOLVED | Noted: 2021-11-04 | Resolved: 2022-03-21

## 2022-03-21 PROBLEM — R09.82 POST-NASAL DRIP: Status: RESOLVED | Noted: 2021-06-18 | Resolved: 2022-03-21

## 2022-03-21 PROBLEM — R21 RASH AND NONSPECIFIC SKIN ERUPTION: Status: RESOLVED | Noted: 2021-12-13 | Resolved: 2022-03-21

## 2022-03-21 PROBLEM — R09.81 HEAD CONGESTION: Status: RESOLVED | Noted: 2021-12-03 | Resolved: 2022-03-21

## 2022-03-21 NOTE — PROGRESS NOTES
SUBJECTIVE:    Patient ID: Lidya Benoit is a 66 y.o. female.    Chief Complaint: Follow-up and Diabetes    HPI  Lidya Benoit is a 66 y.o. female with a hx of NIDDM who comes in for her scheduled follow up.    Her last A1c was 10% three months ago. At that time, she was started on Ozempic. She is also on Metformin 1000 BID and Glipizide 10mg TR24. Today, her POC A1c is 10.2%. Unfortunately, the patient was unable to fill the Ozempic due to cost, but states that she is now able to get the medication. She continues to take the other two medications as directed, and denies hypoglycemic episodes including no dizziness, nausea, confusion, shaking, or diaphoresis. Endorses polydipsia and intermittently hazy vision. No polyuria or paresthesias.     She will be going to her 's eye doctor soon for her diabetic eye exam.    Needs refills on Atorvastatin and Clobetasol.    Office Visit on 03/22/2022   Component Date Value Ref Range Status    Hemoglobin A1C 03/22/2022 10.2  % Final   Office Visit on 12/23/2021   Component Date Value Ref Range Status    Hemoglobin A1C 12/23/2021 10.0  % Final   Lab Visit on 12/13/2021   Component Date Value Ref Range Status    Soybean IgE 12/13/2021 <0.10  Class 0 kU/L Final    Wheat IgE 12/13/2021 <0.10  Class 0 kU/L Final    Wheat IgE 12/13/2021 <0.10  Class 0 kU/L Final    CRP 12/13/2021 0.16  <0.76 mg/dL Final    [facility] Miscellaneous Test 12/13/2021 COMMENT   Final    [facility] Test Code: 12/13/2021 [id number]   Final    [facility] Test Name: 12/13/2021 Olga, Allergen   Final    [facility] Miscellaneous Test 12/13/2021 COMMENT   Final    [facility] Test Code: 12/13/2021 [id number]   Final    [facility] Test Name: 12/13/2021 Garlic, Allergen   Final    [facility] Miscellaneous Test 12/13/2021 COMMENT   Final    [facility] Test Code: 12/13/2021 [id number]   Final    [facility] Test Name: 12/13/2021 Immune Complexes, C1q Binding   Final   Office Visit on 12/02/2021   Component Date Value Ref Range Status     POC Rapid COVID 12/02/2021 Negative  Negative Final     Acceptable 12/02/2021 Yes   Final    Rapid Strep A Screen 12/02/2021 Negative  Negative Final     Acceptable 12/02/2021 Yes   Final    Rapid Influenza A Ag 12/02/2021 Negative  Negative Final    Rapid Influenza B Ag 12/02/2021 Negative  Negative Final     Acceptable 12/02/2021 Yes   Final       Past Medical History:   Diagnosis Date    Diabetes mellitus, type 2     Lupus      History reviewed. No pertinent surgical history.  Family History   Problem Relation Age of Onset    Psoriasis Maternal Aunt     Melanoma Neg Hx     Lupus Neg Hx     Eczema Neg Hx        Tobacco History:  reports that she has never smoked. She has never used smokeless tobacco.  Alcohol History:  reports no history of alcohol use.  Drug History:  reports no history of drug use.    Review of patient's allergies indicates:   Allergen Reactions    Iodine and iodide containing products     Msg [glutamic acid]        Current Outpatient Medications:     acetaminophen (TYLENOL) 500 MG tablet, , Disp: , Rfl:     aspirin 81 MG Chew, Take 1 tablet (81 mg total) by mouth once daily., Disp: 90 tablet, Rfl: 3    camphor-menthol 0.5-0.5% (SARNA ORIGINAL) lotion, Apply topically as needed., Disp: 222 mL, Rfl: 3    cetirizine (ZYRTEC) 10 MG tablet, Take 1 tablet (10 mg total) by mouth 2 (two) times a day., Disp: 180 tablet, Rfl: 3    EPINEPHrine (EPIPEN 2-MARCIA) 0.3 mg/0.3 mL AtIn, Inject contents of one syringe into muscle at first sign of severe allergic reaction., Disp: 1 each, Rfl: 2    famotidine (PEPCID) 20 MG tablet, Take 1 tablet (20 mg total) by mouth 2 (two) times daily., Disp: 180 tablet, Rfl: 3    fluticasone propionate (FLONASE) 50 mcg/actuation nasal spray, 2 sprays (100 mcg total) by Each Nostril route 2 (two) times daily as needed for Rhinitis or Allergies., Disp: 13 g, Rfl: 2    hydrocortisone 2.5 % cream, Apply topically  "2 (two) times daily., Disp: 453 g, Rfl: 1    metFORMIN 500 mg/5 mL SSRR, Take 10 mLs by mouth 2 (two) times a day., Disp: 480 mL, Rfl: 5    naproxen (NAPROSYN) 500 MG tablet, Take 1 tablet (500 mg total) by mouth 2 (two) times daily as needed (pain). With meals, Disp: 90 tablet, Rfl: 1    pantoprazole (PROTONIX) 40 mg suspension, MIX 1 PACKET INTO 8OZ OF FLUID AND DRINK BY MOUTH EVERY DAY, Disp: , Rfl:     urea (CARMOL) 40 % Crea, , Disp: , Rfl:     atorvastatin (LIPITOR) 20 MG tablet, Take 2 tablets (40 mg total) by mouth once daily., Disp: 180 tablet, Rfl: 3    clobetasol 0.05% (TEMOVATE) 0.05 % Oint, Apply to affected area BID. Use sparingly., Disp: 60 g, Rfl: 1    glipiZIDE (GLUCOTROL) 10 MG TR24, Take 1 tablet (10 mg total) by mouth daily with breakfast., Disp: 90 tablet, Rfl: 3    semaglutide (OZEMPIC) 0.25 mg or 0.5 mg(2 mg/1.5 mL) pen injector, Inject 0.5 mg into the skin every 7 days., Disp: 3 pen, Rfl: 3    Review of Systems  As in HPI           Objective:      Vitals:    03/22/22 0853   BP: 130/80   Pulse: 86   Resp: 18   SpO2: 97%   Weight: 85.7 kg (189 lb)   Height: 5' 3" (1.6 m)     Physical Exam  Vitals reviewed.   Constitutional:       General: She is not in acute distress.  Cardiovascular:      Rate and Rhythm: Normal rate and regular rhythm.      Pulses: Normal pulses.      Heart sounds: Normal heart sounds.   Pulmonary:      Effort: Pulmonary effort is normal.      Breath sounds: Normal breath sounds.   Musculoskeletal:      Right lower leg: No edema.      Left lower leg: No edema.   Skin:     General: Skin is warm and dry.   Neurological:      General: No focal deficit present.      Mental Status: She is alert and oriented to person, place, and time.   Psychiatric:         Mood and Affect: Mood normal.         Behavior: Behavior normal.              Assessment:       1. Type 2 diabetes mellitus without complication, without long-term current use of insulin    2. Dyslipidemia    3. Rash  "            Plan:       Type 2 diabetes mellitus without complication, without long-term current use of insulin - unchanged, still uncontrolled, but there was noncompliance with GLP-1RA over past three months. Reviewed importance of regaining control. Patient will now start the Ozempic; anticipate improvement in 3 months. Hypoglycemic  precautions reviewed. Labs before next OV.  -     Hemoglobin A1C, POCT  -     atorvastatin (LIPITOR) 20 MG tablet; Take 2 tablets (40 mg total) by mouth once daily.  Dispense: 180 tablet; Refill: 3  -     glipiZIDE (GLUCOTROL) 10 MG TR24; Take 1 tablet (10 mg total) by mouth daily with breakfast.  Dispense: 90 tablet; Refill: 3  -     semaglutide (OZEMPIC) 0.25 mg or 0.5 mg(2 mg/1.5 mL) pen injector; Inject 0.5 mg into the skin every 7 days.  Dispense: 3 pen; Refill: 3  -     Microalbumin/Creatinine Ratio, Urine; Future; Expected date: 03/22/2022  -     Hemoglobin A1C; Future; Expected date: 03/22/2022  -     Lipid Panel; Future; Expected date: 03/22/2022  -     Comprehensive Metabolic Panel; Future; Expected date: 03/22/2022    Dyslipidemia  -     atorvastatin (LIPITOR) 20 MG tablet; Take 2 tablets (40 mg total) by mouth once daily.  Dispense: 180 tablet; Refill: 3  -     Lipid Panel; Future; Expected date: 03/22/2022  -     Comprehensive Metabolic Panel; Future; Expected date: 03/22/2022    Rash - reviewed importance of using sparingly, never on face or hands. Patient verbalized understanding and agreement.  -     clobetasol 0.05% (TEMOVATE) 0.05 % Oint; Apply to affected area BID. Use sparingly.  Dispense: 60 g; Refill: 1      No follow-ups on file.        3/22/2022 Cheryle Jones M.D.

## 2022-03-22 ENCOUNTER — OFFICE VISIT (OUTPATIENT)
Dept: FAMILY MEDICINE | Facility: CLINIC | Age: 67
End: 2022-03-22
Payer: OTHER GOVERNMENT

## 2022-03-22 VITALS
OXYGEN SATURATION: 97 % | HEART RATE: 86 BPM | DIASTOLIC BLOOD PRESSURE: 80 MMHG | BODY MASS INDEX: 33.49 KG/M2 | HEIGHT: 63 IN | SYSTOLIC BLOOD PRESSURE: 130 MMHG | RESPIRATION RATE: 18 BRPM | WEIGHT: 189 LBS

## 2022-03-22 DIAGNOSIS — E78.5 DYSLIPIDEMIA: ICD-10-CM

## 2022-03-22 DIAGNOSIS — E11.9 TYPE 2 DIABETES MELLITUS WITHOUT COMPLICATION, WITHOUT LONG-TERM CURRENT USE OF INSULIN: Primary | ICD-10-CM

## 2022-03-22 DIAGNOSIS — R21 RASH: ICD-10-CM

## 2022-03-22 LAB — HBA1C MFR BLD: 10.2 %

## 2022-03-22 PROCEDURE — 99213 PR OFFICE/OUTPT VISIT, EST, LEVL III, 20-29 MIN: ICD-10-PCS | Mod: S$PBB,AQ,, | Performed by: FAMILY MEDICINE

## 2022-03-22 PROCEDURE — 99213 OFFICE O/P EST LOW 20 MIN: CPT | Mod: S$PBB,AQ,, | Performed by: FAMILY MEDICINE

## 2022-03-22 PROCEDURE — 99215 OFFICE O/P EST HI 40 MIN: CPT | Performed by: FAMILY MEDICINE

## 2022-03-22 PROCEDURE — 83036 HEMOGLOBIN GLYCOSYLATED A1C: CPT | Mod: PBBFAC | Performed by: FAMILY MEDICINE

## 2022-03-22 RX ORDER — SEMAGLUTIDE 1.34 MG/ML
0.5 INJECTION, SOLUTION SUBCUTANEOUS
Qty: 3 PEN | Refills: 3 | Status: SHIPPED | OUTPATIENT
Start: 2022-03-22 | End: 2022-06-23

## 2022-03-22 RX ORDER — ATORVASTATIN CALCIUM 20 MG/1
40 TABLET, FILM COATED ORAL DAILY
Qty: 180 TABLET | Refills: 3 | Status: SHIPPED | OUTPATIENT
Start: 2022-03-22 | End: 2022-09-27 | Stop reason: SDUPTHER

## 2022-03-22 RX ORDER — GLIPIZIDE 10 MG/1
10 TABLET, FILM COATED, EXTENDED RELEASE ORAL
Qty: 90 TABLET | Refills: 3 | Status: SHIPPED | OUTPATIENT
Start: 2022-03-22 | End: 2022-09-27 | Stop reason: SDUPTHER

## 2022-03-22 RX ORDER — CLOBETASOL PROPIONATE 0.5 MG/G
OINTMENT TOPICAL
Qty: 60 G | Refills: 1 | Status: SHIPPED | OUTPATIENT
Start: 2022-03-22 | End: 2022-08-24

## 2022-03-31 DIAGNOSIS — R09.81 HEAD CONGESTION: ICD-10-CM

## 2022-03-31 DIAGNOSIS — H65.193 ACUTE MUCOID OTITIS MEDIA OF BOTH EARS: ICD-10-CM

## 2022-04-01 RX ORDER — FLUTICASONE PROPIONATE 50 MCG
2 SPRAY, SUSPENSION (ML) NASAL 2 TIMES DAILY PRN
Qty: 13 G | Refills: 2 | Status: SHIPPED | OUTPATIENT
Start: 2022-04-01 | End: 2024-01-19 | Stop reason: ALTCHOICE

## 2022-06-18 LAB
ALBUMIN SERPL-MCNC: 4.8 G/DL (ref 3.8–4.8)
ALBUMIN/CREAT UR: 14 MG/G CREAT (ref 0–29)
ALBUMIN/GLOB SERPL: 1.8 {RATIO} (ref 1.2–2.2)
ALP SERPL-CCNC: 122 IU/L (ref 44–121)
ALT SERPL-CCNC: 33 IU/L (ref 0–32)
AST SERPL-CCNC: 30 IU/L (ref 0–40)
BILIRUB SERPL-MCNC: 0.5 MG/DL (ref 0–1.2)
BUN SERPL-MCNC: 12 MG/DL (ref 8–27)
BUN/CREAT SERPL: 13 (ref 12–28)
CALCIUM SERPL-MCNC: 10.1 MG/DL (ref 8.7–10.3)
CHLORIDE SERPL-SCNC: 101 MMOL/L (ref 96–106)
CHOLEST SERPL-MCNC: 140 MG/DL (ref 100–199)
CO2 SERPL-SCNC: 25 MMOL/L (ref 20–29)
CREAT SERPL-MCNC: 0.9 MG/DL (ref 0.57–1)
CREAT UR-MCNC: 413.3 MG/DL
EST. GFR  (NO RACE VARIABLE): 71 ML/MIN/1.73
GLOBULIN SER CALC-MCNC: 2.6 G/DL (ref 1.5–4.5)
GLUCOSE SERPL-MCNC: 250 MG/DL (ref 65–99)
HBA1C MFR BLD: 11.9 % (ref 4.8–5.6)
HDLC SERPL-MCNC: 41 MG/DL
LDLC SERPL CALC-MCNC: 71 MG/DL (ref 0–99)
MICROALBUMIN UR-MCNC: 57.4 UG/ML
POTASSIUM SERPL-SCNC: 4.3 MMOL/L (ref 3.5–5.2)
PROT SERPL-MCNC: 7.4 G/DL (ref 6–8.5)
SODIUM SERPL-SCNC: 141 MMOL/L (ref 134–144)
TRIGL SERPL-MCNC: 165 MG/DL (ref 0–149)
VLDLC SERPL CALC-MCNC: 28 MG/DL (ref 5–40)

## 2022-06-23 ENCOUNTER — OFFICE VISIT (OUTPATIENT)
Dept: FAMILY MEDICINE | Facility: CLINIC | Age: 67
End: 2022-06-23
Payer: MEDICARE

## 2022-06-23 VITALS
WEIGHT: 187 LBS | HEART RATE: 75 BPM | DIASTOLIC BLOOD PRESSURE: 70 MMHG | RESPIRATION RATE: 16 BRPM | SYSTOLIC BLOOD PRESSURE: 122 MMHG | OXYGEN SATURATION: 98 % | BODY MASS INDEX: 33.13 KG/M2 | HEIGHT: 63 IN

## 2022-06-23 DIAGNOSIS — Z12.31 BREAST CANCER SCREENING BY MAMMOGRAM: ICD-10-CM

## 2022-06-23 DIAGNOSIS — E11.9 TYPE 2 DIABETES MELLITUS WITHOUT COMPLICATION, WITHOUT LONG-TERM CURRENT USE OF INSULIN: Primary | ICD-10-CM

## 2022-06-23 PROCEDURE — 99213 PR OFFICE/OUTPT VISIT, EST, LEVL III, 20-29 MIN: ICD-10-PCS | Mod: S$PBB,AQ,, | Performed by: FAMILY MEDICINE

## 2022-06-23 PROCEDURE — 99213 OFFICE O/P EST LOW 20 MIN: CPT | Mod: S$PBB,AQ,, | Performed by: FAMILY MEDICINE

## 2022-06-23 PROCEDURE — 99215 OFFICE O/P EST HI 40 MIN: CPT | Performed by: FAMILY MEDICINE

## 2022-06-23 NOTE — PROGRESS NOTES
SUBJECTIVE:    Patient ID: Lidya Benoit is a 67 y.o. female.    Chief Complaint: Diabetes and Hyperlipidemia    HPI    Lidya Benoit is a 67 y.o. female with a hx of NIDDM who comes in for her scheduled follow up.     Her A1c is 11.9% (up from 10% three months ago). At last visit, she was going to begin adding Ozempic to her regimen on Metformin 1000 BID and Glipizide 10mg TR24. Today, she reports she is using Ozempic but has remained at 0.25mg weekly, and stopped the Metformin altogether. She endorses polydipsia, polyuria, and occasionally cloudy vision, no paresthesias. No chest pain or pressure or shortness of breath. No hypoglycemic episodes. Had diabetic eye exam  recently, but cannot remember provider name.    HM: agrees to order for Mammo.      Office Visit on 03/22/2022   Component Date Value Ref Range Status    Hemoglobin A1C 03/22/2022 10.2  % Final    Creatinine, Urine 06/17/2022 413.3  Not Estab. mg/dL Final    Microalb, Ur 06/17/2022 57.4  Not Estab. ug/mL Final    Microalb/Crt. Ratio 06/17/2022 14  0 - 29 mg/g creat Final    Hemoglobin A1c 06/17/2022 11.9 (A) 4.8 - 5.6 % Final    Cholesterol 06/17/2022 140  100 - 199 mg/dL Final    Triglycerides 06/17/2022 165 (A) 0 - 149 mg/dL Final    HDL 06/17/2022 41  >39 mg/dL Final    VLDL Cholesterol Christopher 06/17/2022 28  5 - 40 mg/dL Final    LDL Calculated 06/17/2022 71  0 - 99 mg/dL Final    Glucose 06/17/2022 250 (A) 65 - 99 mg/dL Final    BUN 06/17/2022 12  8 - 27 mg/dL Final    Creatinine 06/17/2022 0.90  0.57 - 1.00 mg/dL Final    eGFR no Race variable 06/17/2022 71  >59 mL/min/1.73 Final    BUN/Creatinine Ratio 06/17/2022 13  12 - 28 Final    Sodium 06/17/2022 141  134 - 144 mmol/L Final    Potassium 06/17/2022 4.3  3.5 - 5.2 mmol/L Final    Chloride 06/17/2022 101  96 - 106 mmol/L Final    CO2 06/17/2022 25  20 - 29 mmol/L Final    Calcium 06/17/2022 10.1  8.7 - 10.3 mg/dL Final    Protein, Total 06/17/2022 7.4  6.0 - 8.5 g/dL Final     Albumin 06/17/2022 4.8  3.8 - 4.8 g/dL Final    Globulin, Total 06/17/2022 2.6  1.5 - 4.5 g/dL Final    Albumin/Globulin Ratio 06/17/2022 1.8  1.2 - 2.2 Final    Total Bilirubin 06/17/2022 0.5  0.0 - 1.2 mg/dL Final    Alkaline Phosphatase 06/17/2022 122 (A) 44 - 121 IU/L Final    AST 06/17/2022 30  0 - 40 IU/L Final    ALT 06/17/2022 33 (A) 0 - 32 IU/L Final       Past Medical History:   Diagnosis Date    Diabetes mellitus, type 2     Lupus      History reviewed. No pertinent surgical history.  Family History   Problem Relation Age of Onset    Psoriasis Maternal Aunt     Melanoma Neg Hx     Lupus Neg Hx     Eczema Neg Hx        Tobacco History:  reports that she has never smoked. She has never used smokeless tobacco.  Alcohol History:  reports no history of alcohol use.  Drug History:  reports no history of drug use.    Review of patient's allergies indicates:   Allergen Reactions    Iodine and iodide containing products     Msg [glutamic acid]        Current Outpatient Medications:     acetaminophen (TYLENOL) 500 MG tablet, , Disp: , Rfl:     aspirin 81 MG Chew, Take 1 tablet (81 mg total) by mouth once daily., Disp: 90 tablet, Rfl: 3    atorvastatin (LIPITOR) 20 MG tablet, Take 2 tablets (40 mg total) by mouth once daily., Disp: 180 tablet, Rfl: 3    camphor-menthol 0.5-0.5% (SARNA ORIGINAL) lotion, Apply topically as needed., Disp: 222 mL, Rfl: 3    cetirizine (ZYRTEC) 10 MG tablet, Take 1 tablet (10 mg total) by mouth 2 (two) times a day., Disp: 180 tablet, Rfl: 3    clobetasol 0.05% (TEMOVATE) 0.05 % Oint, Apply to affected area BID. Use sparingly., Disp: 60 g, Rfl: 1    EPINEPHrine (EPIPEN 2-MARCIA) 0.3 mg/0.3 mL AtIn, Inject contents of one syringe into muscle at first sign of severe allergic reaction., Disp: 1 each, Rfl: 2    famotidine (PEPCID) 20 MG tablet, Take 1 tablet (20 mg total) by mouth 2 (two) times daily., Disp: 180 tablet, Rfl: 3    fluticasone propionate (FLONASE) 50  "mcg/actuation nasal spray, 2 sprays (100 mcg total) by Each Nostril route 2 (two) times daily as needed for Rhinitis or Allergies., Disp: 13 g, Rfl: 2    glipiZIDE (GLUCOTROL) 10 MG TR24, Take 1 tablet (10 mg total) by mouth daily with breakfast., Disp: 90 tablet, Rfl: 3    hydrocortisone 2.5 % cream, Apply topically 2 (two) times daily., Disp: 453 g, Rfl: 1    naproxen (NAPROSYN) 500 MG tablet, Take 1 tablet (500 mg total) by mouth 2 (two) times daily as needed (pain). With meals, Disp: 90 tablet, Rfl: 1    pantoprazole (PROTONIX) 40 mg suspension, MIX 1 PACKET INTO 8OZ OF FLUID AND DRINK BY MOUTH EVERY DAY, Disp: , Rfl:     urea (CARMOL) 40 % Crea, , Disp: , Rfl:     metFORMIN 500 mg/5 mL SSRR, Take 10 mLs by mouth 2 (two) times a day., Disp: 480 mL, Rfl: 5    semaglutide (OZEMPIC) 1 mg/dose (4 mg/3 mL), Inject 1 mg into the skin every 7 days., Disp: 3 pen, Rfl: 3    Review of Systems  As in HPI           Objective:      Vitals:    06/23/22 0804   BP: 122/70   Pulse: 75   Resp: 16   SpO2: 98%   Weight: 84.8 kg (187 lb)   Height: 5' 3" (1.6 m)     Physical Exam  Vitals reviewed.   Constitutional:       General: She is not in acute distress.  Cardiovascular:      Rate and Rhythm: Normal rate and regular rhythm.      Pulses: Normal pulses.      Heart sounds: Normal heart sounds.   Pulmonary:      Effort: Pulmonary effort is normal.      Breath sounds: Normal breath sounds.   Musculoskeletal:      Right lower leg: No edema.      Left lower leg: No edema.   Skin:     General: Skin is warm and dry.   Neurological:      General: No focal deficit present.      Mental Status: She is alert and oriented to person, place, and time.   Psychiatric:         Mood and Affect: Mood normal.         Behavior: Behavior normal.              Assessment:       1. Type 2 diabetes mellitus without complication, without long-term current use of insulin    2. Breast cancer screening by mammogram             Plan:       Type 2 diabetes " mellitus without complication, without long-term current use of insulin worsened since last OV. Discussed need to remain on Metformin, which she will resume. Had difficulty swallowing pills, so liquid form was rx'd. Will also increase Ozempic to 0.5mg x 4 weeks, then up to 1mg per week. Written instructions provided. Patient verbalizes understanding and agreement with plan. RTC in 3 months or sooner prn. Next OV: foot exam!  -     metFORMIN 500 mg/5 mL SSRR; Take 10 mLs by mouth 2 (two) times a day.  Dispense: 480 mL; Refill: 5  -     semaglutide (OZEMPIC) 1 mg/dose (4 mg/3 mL); Inject 1 mg into the skin every 7 days.  Dispense: 3 pen; Refill: 3    Breast cancer screening by mammogram  -     Mammo Digital Screening Bilat w/ Baron; Future; Expected date: 06/23/2022      No follow-ups on file.        6/23/2022 Cheryle Jones M.D.

## 2022-07-25 ENCOUNTER — LAB VISIT (OUTPATIENT)
Dept: LAB | Facility: HOSPITAL | Age: 67
End: 2022-07-25
Attending: ALLERGY & IMMUNOLOGY
Payer: MEDICARE

## 2022-07-25 ENCOUNTER — OFFICE VISIT (OUTPATIENT)
Dept: ALLERGY | Facility: CLINIC | Age: 67
End: 2022-07-25
Payer: MEDICARE

## 2022-07-25 VITALS
OXYGEN SATURATION: 98 % | WEIGHT: 185.38 LBS | BODY MASS INDEX: 32.84 KG/M2 | TEMPERATURE: 97 F | SYSTOLIC BLOOD PRESSURE: 134 MMHG | HEART RATE: 93 BPM | DIASTOLIC BLOOD PRESSURE: 82 MMHG

## 2022-07-25 DIAGNOSIS — Z91.018 FOOD ALLERGY: ICD-10-CM

## 2022-07-25 DIAGNOSIS — E11.9 TYPE 2 DIABETES MELLITUS WITHOUT COMPLICATION, WITHOUT LONG-TERM CURRENT USE OF INSULIN: ICD-10-CM

## 2022-07-25 DIAGNOSIS — L29.9 PRURITUS: ICD-10-CM

## 2022-07-25 DIAGNOSIS — R21 RASH AND NONSPECIFIC SKIN ERUPTION: Primary | ICD-10-CM

## 2022-07-25 DIAGNOSIS — L50.8 OTHER URTICARIA: ICD-10-CM

## 2022-07-25 DIAGNOSIS — R21 RASH AND NONSPECIFIC SKIN ERUPTION: ICD-10-CM

## 2022-07-25 PROCEDURE — 36415 COLL VENOUS BLD VENIPUNCTURE: CPT | Performed by: ALLERGY & IMMUNOLOGY

## 2022-07-25 PROCEDURE — 86003 ALLG SPEC IGE CRUDE XTRC EA: CPT | Mod: 59 | Performed by: ALLERGY & IMMUNOLOGY

## 2022-07-25 PROCEDURE — 86003 ALLG SPEC IGE CRUDE XTRC EA: CPT | Performed by: ALLERGY & IMMUNOLOGY

## 2022-07-25 PROCEDURE — 99214 PR OFFICE/OUTPT VISIT, EST, LEVL IV, 30-39 MIN: ICD-10-PCS | Mod: S$GLB,,, | Performed by: ALLERGY & IMMUNOLOGY

## 2022-07-25 PROCEDURE — 99214 OFFICE O/P EST MOD 30 MIN: CPT | Mod: S$GLB,,, | Performed by: ALLERGY & IMMUNOLOGY

## 2022-07-25 RX ORDER — PREDNISONE 5 MG/1
5 TABLET ORAL DAILY
Qty: 5 TABLET | Refills: 0 | Status: SHIPPED | OUTPATIENT
Start: 2022-07-25

## 2022-07-25 RX ORDER — TACROLIMUS 1 MG/G
OINTMENT TOPICAL 2 TIMES DAILY
Qty: 100 G | Refills: 3 | Status: SHIPPED | OUTPATIENT
Start: 2022-07-25

## 2022-07-25 NOTE — PROGRESS NOTES
"Subjective:       Patient ID: Lidya Benoit is a 67 y.o. female.    Chief Complaint: Rash (Rash apperared about a week ago w/ swelling and redness under both eyes. Suspecting it was lime when eating mexican food cause rash started immediately after dinner. )    HPI     Pt presents today for rash.     Her last visit 1/22    Her rash was improved cinthia but now exacerbated.     Uncertain cause , occurred after eating Mexican food, more specifically lime/avocado.   Few hours afterwards, started to have itching, facial rash and right leg rash.   She also had a strawberry, apple juice smoothie. This is also associated with facial swelling.     However, when she made spicy chicken vazquez- relates the "heat" spicy.     Rash   Condition:  Exacerbated    Prior she was on BID antihistamines. Currently off routine use.   Her current rash 7/2022: redness, with a darker pigement and scale on bilateral cheeks and right lower leg.     Onset 10 years ago - occurred prior to her current medication.   Again in 11/2021  Again 4 days ago   Sx: itching is improved on BID H1   erythematous rash , "whole body tingling"   Face, bilateral wrists, lateral legs, back.   "unbearable itching"   Hyperesthesia.   Spares palms and soles  Duration: few days   asso : lip swelling, facial swelling.   Tx: H1 BID.   coffee , ceravae, benadryl.  Benadryl may help but she will have a HA.   She has an epi pen but has never used it.     Augmentin given early 12/2021 for otitis media.     Pt is suspicious of foods such as soy, garlic, tomato, asian food- olga.   No one food is consistent.     Pt states she doesn't have any problems with milk or wheat.     This may be a frquency of 1 time per week.      PMH:  DM NIDDM-2019 onset.     Fhx:  Neg atopic history     12/2021   U385-JmT Olga   Q667-LcK Olga                <0.10                F209-AeA Garlic   K388-RtP Garlic                <0.10                Immune Complexes, C1q Binding  <1.2             ug " Eq/mL BN  (negative)    Component      Latest Ref Rng & Units 12/13/2021 12/13/2021           2:42 PM  2:42 PM   Soybean IgE      Class 0 kU/L  <0.10   Wheat IgE      Class 0 kU/L <0.10 <0.10   CRP      <0.76 mg/dL  0.16       Component      Latest Ref Rng & Units 3/23/2021   Sed Rate      0 - 40 mm/hr 49 (H)      Component      Latest Ref Rng & Units 3/23/2021   C3 Complement      82 - 167 mg/dL 180 (H)   C4 Complement      12 - 38 mg/dL 35     Component      Latest Ref Rng & Units 3/23/2021   WBC      3.4 - 10.8 x10E3/uL 5.1   RBC      3.77 - 5.28 x10E6/uL 5.14   Hemoglobin      11.1 - 15.9 g/dL 15.5   Hematocrit      34.0 - 46.6 % 49.0 (H)   MCV      79 - 97 fL 95   MCH      26.6 - 33.0 pg 30.2   MCHC      31.5 - 35.7 g/dL 31.6   RDW      11.7 - 15.4 % 12.1   Platelets      150 - 450 x10E3/uL 214   Neutrophils      Not Estab. % 53   Lymph %      Not Estab. % 34   Mono %      Not Estab. % 9   Eosinophil %      Not Estab. % 3   Basophil %      Not Estab. % 1   Neutrophils, Abs      1.4 - 7.0 x10E3/uL 2.7   Lymph #      0.7 - 3.1 x10E3/uL 1.7   Mono #      0.1 - 0.9 x10E3/uL 0.4   Eos #      0.0 - 0.4 x10E3/uL 0.2   Baso #      0.0 - 0.2 x10E3/uL 0.0   Immature Granulocytes      Not Estab. % 0   Immature Grans (Abs)      0.0 - 0.1 x10E3/uL 0.0           General: neg unexpected weight changes, fevers, chills, night sweats, malaise  HEENT: see hpi, Neg eye pain, vision changes, ear drainage, nose bleeds, throat tightness, sores in the mouth  CV: Neg chest pain, palpitations, swelling  Resp: see hpi, neg shortness of breath, hemoptysis, cough  GI: see hpi, neg dysphagia, night abdominal pain, reflux, chronic diarrhea, chronic constipation  Derm: See Hpi, neg new rash, neg flushing  Mu/sk: Neg joint pain, joint swelling   Psych: Neg anxiety  neuro: neg chronic headaches, muscle weakness  Endo: neg heat/cold intolerance, chronic fatigue    Objective:     Vitals:    07/25/22 0813   BP: 134/82   Pulse: 93   Temp: 96.8 °F  "(36 °C)   SpO2: 98%   Weight: 84.1 kg (185 lb 6.4 oz)        Physical Exam    General: no acute distress, well developed well nourished   HEENT:   Head:normocephalic atraumatic  Eyes: KATELYN, EOMI, Neg injection, scleral icterus, or conjunctival papillary hypertrophy.  Skin: bilateral cheeks erythema, with darker wineish erythema, right calf darker pigment, with a "crust on top".     Assessment:       1. Rash and nonspecific skin eruption    2. Pruritus    3. Type 2 diabetes mellitus without complication, without long-term current use of insulin    4. Food allergy    5. Other urticaria         Plan:       Rash and nonspecific skin eruption  -     predniSONE (DELTASONE) 5 MG tablet; Take 1 tablet (5 mg total) by mouth once daily.  Dispense: 5 tablet; Refill: 0  -     Ambulatory referral/consult to Dermatology; Future; Expected date: 08/01/2022  -     tacrolimus (PROTOPIC) 0.1 % ointment; Apply topically 2 (two) times daily.  Dispense: 100 g; Refill: 3  -     Lime IgE; Future; Expected date: 07/25/2022  -     Allergen-Avocado; Future; Expected date: 07/25/2022    Pruritus  -     predniSONE (DELTASONE) 5 MG tablet; Take 1 tablet (5 mg total) by mouth once daily.  Dispense: 5 tablet; Refill: 0  -     Ambulatory referral/consult to Dermatology; Future; Expected date: 08/01/2022  -     tacrolimus (PROTOPIC) 0.1 % ointment; Apply topically 2 (two) times daily.  Dispense: 100 g; Refill: 3  -     Lime IgE; Future; Expected date: 07/25/2022  -     Allergen-Avocado; Future; Expected date: 07/25/2022    Type 2 diabetes mellitus without complication, without long-term current use of insulin    Food allergy  -     Lime IgE; Future; Expected date: 07/25/2022  -     Allergen-Avocado; Future; Expected date: 07/25/2022  -     Lemon IgE; Future; Expected date: 07/25/2022    Other urticaria   -     Lime IgE; Future; Expected date: 07/25/2022  -     Lemon IgE; Future; Expected date: 07/25/2022            Rash   7/22:  Exacerbated   Using " hydrocortisone , will increase to stronger  Oral pred 5 mg po qday.   Would like to get it biopsied with dermatology. Send referral   Pt to send me pictures in the chart. Concern for darker pigmentation in the face and leg. Doesn't appear classic atopic derm.   protopic 100 mg so she can use on the face and leg. Non steroidal.     1/22   reveiwed all labs and negative for acquired angioedema and food sensitivity   cotninued hydrocortisone 2.5 %  Discussed low histamine diet  Increase to 2 h1 bid if needed  Continue h2 bid     counselled about triggers and triggers.     12/2021  Start high dose antihistamines   H1, H2  Topical steroids prn   hydrocoritsone 2.5 % prn BID    C1q binding assay to eval for angioedema- negative     Diabetes non insulin dependence   Use steroid judiciously due to DM   7/22: 5 mg po qday x 5 days, pt knows risks of oral steroids, discussed    Oral steroids such as prednisone, prednisolone, medrol dose packs etc  May cause health effects of posterior cataracts, increased blood pressure, increased blood sugar, weight gain, emotional lability, gastritis, decreased bone density if taken long-term. These are used judiciously as medically indicated.       Follow up in 6 months, sooner if needed.         Nicolasa Gruber M.D.  Allergy/Immunology  [facility]   [phone] phone  [phone] fax

## 2022-07-25 NOTE — PATIENT INSTRUCTIONS
Oral steroids: Prednisone 5 mg daily in the morning with food.     Monitor blood sugar.     Oral steroids such as prednisone, prednisolone, medrol dose packs etc  May cause health effects of posterior cataracts, increased blood pressure, increased blood sugar, weight gain, emotional lability, gastritis, decreased bone density if taken long-term. These are used judiciously as medically indicated.       Protopic: face or leg application twice per day. May use with clobetasol.     Dermatology referral given for biopsy. Dr. Justine Sher.   Call them for an appointment.     Protopic - for bug bites.     Go back to twice per day zyrtec as your were before and see if this also reduces swelling. 1-2 tablets.     Follow up in 6 months

## 2022-07-28 LAB
AVOCADO IGE QN: <0.1 KU/L
LEMON IGE QN: <0.1 KU/L
LIME IGE QN: <0.1 KU/L

## 2022-09-27 ENCOUNTER — OFFICE VISIT (OUTPATIENT)
Dept: FAMILY MEDICINE | Facility: CLINIC | Age: 67
End: 2022-09-27
Payer: MEDICARE

## 2022-09-27 VITALS
BODY MASS INDEX: 32.6 KG/M2 | WEIGHT: 184 LBS | HEART RATE: 68 BPM | OXYGEN SATURATION: 98 % | DIASTOLIC BLOOD PRESSURE: 74 MMHG | SYSTOLIC BLOOD PRESSURE: 124 MMHG | HEIGHT: 63 IN | RESPIRATION RATE: 18 BRPM

## 2022-09-27 DIAGNOSIS — E11.9 TYPE 2 DIABETES MELLITUS WITHOUT COMPLICATION, WITHOUT LONG-TERM CURRENT USE OF INSULIN: Primary | ICD-10-CM

## 2022-09-27 DIAGNOSIS — Z00.00 HEALTHCARE MAINTENANCE: ICD-10-CM

## 2022-09-27 DIAGNOSIS — E78.5 DYSLIPIDEMIA: ICD-10-CM

## 2022-09-27 LAB — HBA1C MFR BLD: 7.1 %

## 2022-09-27 PROCEDURE — 99214 OFFICE O/P EST MOD 30 MIN: CPT | Mod: S$PBB,AQ,, | Performed by: FAMILY MEDICINE

## 2022-09-27 PROCEDURE — 83036 HEMOGLOBIN GLYCOSYLATED A1C: CPT | Mod: PBBFAC | Performed by: FAMILY MEDICINE

## 2022-09-27 PROCEDURE — 99214 PR OFFICE/OUTPT VISIT, EST, LEVL IV, 30-39 MIN: ICD-10-PCS | Mod: S$PBB,AQ,, | Performed by: FAMILY MEDICINE

## 2022-09-27 PROCEDURE — 99215 OFFICE O/P EST HI 40 MIN: CPT | Performed by: FAMILY MEDICINE

## 2022-09-27 RX ORDER — ATORVASTATIN CALCIUM 20 MG/1
40 TABLET, FILM COATED ORAL DAILY
Qty: 180 TABLET | Refills: 3 | Status: SHIPPED | OUTPATIENT
Start: 2022-09-27 | End: 2022-12-27 | Stop reason: SDUPTHER

## 2022-09-27 RX ORDER — GLIPIZIDE 10 MG/1
10 TABLET, FILM COATED, EXTENDED RELEASE ORAL
Qty: 90 TABLET | Refills: 3 | Status: SHIPPED | OUTPATIENT
Start: 2022-09-27 | End: 2022-12-27 | Stop reason: SDUPTHER

## 2022-09-27 NOTE — PROGRESS NOTES
SUBJECTIVE:    Patient ID: Lidya Benoit is a 67 y.o. female.    Chief Complaint: Follow-up and Diabetes      Lidya Benoit is a 67 y.o. female with a hx of NIDDM who comes in for her scheduled follow up.    *DM - a1c at POC is 7.1% (down from 11.9% three months ago). At last visit, her Metformin was restarted and Ozempic was increased to 0.5mg with instructions to titrate up to 1mg. Today, she reports that she's been taking the medications as rx'd. Still some polydipsia, no polyuria, no vision changes or paresthesias. No chest pain or pressure or shortness of breath. Feeling a lot better overall. Watching what she eats. Sometimes gets hypoglycemia, but consistently related to having missed a meal.      Has been getting rashes, seeing Dr Sher. Being treated as bites.     Sometimes w constipation, then painful BM, +bloody. Has known hemorrhoids.    Needs various refills.    HM: due for mammo (order given last viist), crc screen    Office Visit on 09/27/2022   Component Date Value Ref Range Status    Hemoglobin A1C 09/27/2022 7.1  % Final   Office Visit on 08/24/2022   Component Date Value Ref Range Status    CULTURE, AEROBIC AND ANAEROBIC 08/24/2022  (A)   Final   Lab Visit on 07/25/2022   Component Date Value Ref Range Status    Lime 07/25/2022 <0.10  Class 0 kU/L Final    Avocado 07/25/2022 <0.10  Class 0 kU/L Final    Lemon 07/25/2022 <0.10  Class 0 kU/L Final       Past Medical History:   Diagnosis Date    Diabetes mellitus, type 2     Lupus      History reviewed. No pertinent surgical history.  Family History   Problem Relation Age of Onset    Psoriasis Maternal Aunt     Melanoma Neg Hx     Lupus Neg Hx     Eczema Neg Hx        Tobacco History:  reports that she has never smoked. She has never used smokeless tobacco.  Alcohol History:  reports no history of alcohol use.  Drug History:  reports no history of drug use.    Review of patient's allergies indicates:   Allergen Reactions    Lemon balm (lidya  officinalis) Rash    Lime Rash    Iodine and iodide containing products     Msg [glutamic acid]        Current Outpatient Medications:     acetaminophen (TYLENOL) 500 MG tablet, , Disp: , Rfl:     camphor-menthol 0.5-0.5% (SARNA ORIGINAL) lotion, Apply topically as needed., Disp: 222 mL, Rfl: 3    cetirizine (ZYRTEC) 10 MG tablet, Take 1 tablet (10 mg total) by mouth 2 (two) times a day., Disp: 180 tablet, Rfl: 3    clobetasol 0.05% (TEMOVATE) 0.05 % Oint, Apply twice a day to affected areas for no more than 2-3 wks, Disp: 120 g, Rfl: 2    EPINEPHrine (EPIPEN 2-MARCIA) 0.3 mg/0.3 mL AtIn, Inject contents of one syringe into muscle at first sign of severe allergic reaction., Disp: 1 each, Rfl: 2    famotidine (PEPCID) 20 MG tablet, Take 1 tablet (20 mg total) by mouth 2 (two) times daily., Disp: 180 tablet, Rfl: 3    fluticasone propionate (FLONASE) 50 mcg/actuation nasal spray, 2 sprays (100 mcg total) by Each Nostril route 2 (two) times daily as needed for Rhinitis or Allergies., Disp: 13 g, Rfl: 2    hydrocortisone 2.5 % cream, Apply topically 2 (two) times daily., Disp: 453 g, Rfl: 1    naproxen (NAPROSYN) 500 MG tablet, Take 1 tablet (500 mg total) by mouth 2 (two) times daily as needed (pain). With meals, Disp: 90 tablet, Rfl: 1    pantoprazole (PROTONIX) 40 mg suspension, MIX 1 PACKET INTO 8OZ OF FLUID AND DRINK BY MOUTH EVERY DAY, Disp: , Rfl:     predniSONE (DELTASONE) 5 MG tablet, Take 1 tablet (5 mg total) by mouth once daily., Disp: 5 tablet, Rfl: 0    tacrolimus (PROTOPIC) 0.1 % ointment, Apply topically 2 (two) times daily., Disp: 100 g, Rfl: 3    urea (CARMOL) 40 % Crea, , Disp: , Rfl:     atorvastatin (LIPITOR) 20 MG tablet, Take 2 tablets (40 mg total) by mouth once daily., Disp: 180 tablet, Rfl: 3    glipiZIDE (GLUCOTROL) 10 MG TR24, Take 1 tablet (10 mg total) by mouth daily with breakfast., Disp: 90 tablet, Rfl: 3    metFORMIN 500 mg/5 mL SSRR, Take 10 mLs by mouth 2 (two) times a day., Disp: 480 mL,  "Rfl: 5    semaglutide (OZEMPIC) 1 mg/dose (4 mg/3 mL), Inject 1 mg into the skin every 7 days., Disp: 3 pen, Rfl: 3    Review of Systems   Constitutional:  Negative for fatigue.   Cardiovascular:  Negative for chest pain.   Endocrine: Positive for polydipsia and polyphagia. Negative for polyuria.   Skin:  Negative for pallor.   Neurological:  Positive for weakness. Negative for dizziness, tremors, seizures, speech difficulty and headaches.   Psychiatric/Behavioral:  Negative for confusion. The patient is nervous/anxious.    As in HPI           Objective:      Vitals:    09/27/22 0804   BP: 124/74   Pulse: 68   Resp: 18   SpO2: 98%   Weight: 83.5 kg (184 lb)   Height: 5' 3" (1.6 m)     Physical Exam  Vitals reviewed.   Constitutional:       General: She is not in acute distress.  Cardiovascular:      Rate and Rhythm: Normal rate and regular rhythm.      Pulses: Normal pulses.      Heart sounds: Normal heart sounds.   Pulmonary:      Effort: Pulmonary effort is normal.      Breath sounds: Normal breath sounds.   Musculoskeletal:      Right lower leg: No edema.      Left lower leg: No edema.   Skin:     General: Skin is warm and dry.   Neurological:      General: No focal deficit present.      Mental Status: She is alert and oriented to person, place, and time.   Psychiatric:         Mood and Affect: Mood normal.         Behavior: Behavior normal.            Assessment:       1. Type 2 diabetes mellitus without complication, without long-term current use of insulin    2. Dyslipidemia    3. Healthcare maintenance             Plan:       Type 2 diabetes mellitus without complication, without long-term current use of insulin - much improved! Continue current regimen. Recheck in 3 months. Monitor for possible need to decrease Glipizide over time. Hypoglycemia precautions reviewed.    -     Hemoglobin A1C, POCT  -     semaglutide (OZEMPIC) 1 mg/dose (4 mg/3 mL); Inject 1 mg into the skin every 7 days.  Dispense: 3 pen; " Refill: 3  -     metFORMIN 500 mg/5 mL SSRR; Take 10 mLs by mouth 2 (two) times a day.  Dispense: 480 mL; Refill: 5  -     atorvastatin (LIPITOR) 20 MG tablet; Take 2 tablets (40 mg total) by mouth once daily.  Dispense: 180 tablet; Refill: 3  -     glipiZIDE (GLUCOTROL) 10 MG TR24; Take 1 tablet (10 mg total) by mouth daily with breakfast.  Dispense: 90 tablet; Refill: 3    Dyslipidemia  -     atorvastatin (LIPITOR) 20 MG tablet; Take 2 tablets (40 mg total) by mouth once daily.  Dispense: 180 tablet; Refill: 3    Healthcare maintenance - strongly encouraged to get CRC screening, especially in the setting of new-onset constipation and bpr. Patient declines. Will RTC for Flu shot, not available today. Mammo order placed last OV.    No follow-ups on file.        9/27/2022 Cheryle Jones M.D.

## 2022-11-10 DIAGNOSIS — T78.3XXA ANGIOEDEMA, INITIAL ENCOUNTER: ICD-10-CM

## 2022-11-10 DIAGNOSIS — R21 RASH AND NONSPECIFIC SKIN ERUPTION: ICD-10-CM

## 2022-11-10 NOTE — TELEPHONE ENCOUNTER
Patients 6 month appointment is in January, she is asking for 90 day supply on famotidine and cetirizine as she is out. Can you PRINT the prescriptions so she can pick them up and [facility]. Thanks

## 2022-11-14 RX ORDER — FAMOTIDINE 20 MG/1
20 TABLET, FILM COATED ORAL 2 TIMES DAILY
Qty: 180 TABLET | Refills: 3 | Status: SHIPPED | OUTPATIENT
Start: 2022-11-14 | End: 2024-01-02 | Stop reason: SDUPTHER

## 2022-11-14 RX ORDER — CETIRIZINE HYDROCHLORIDE 10 MG/1
10 TABLET ORAL 2 TIMES DAILY
Qty: 180 TABLET | Refills: 3 | Status: SHIPPED | OUTPATIENT
Start: 2022-11-14 | End: 2023-07-13 | Stop reason: SDUPTHER

## 2022-12-16 ENCOUNTER — TELEPHONE (OUTPATIENT)
Dept: FAMILY MEDICINE | Facility: CLINIC | Age: 67
End: 2022-12-16

## 2022-12-16 NOTE — TELEPHONE ENCOUNTER
There are other similar medications, but not really a one-to-one alternative. Has she called our pharmacy at [facility] and/or [facility]? They might have it in stock. If not, route back to me.

## 2022-12-16 NOTE — TELEPHONE ENCOUNTER
----- Message from Keren Wolf sent at 12/16/2022  8:24 AM CST -----  Regarding: Med refill  Pt called requesting an alternative medication to Ozempic [facility] on [address].  She isn't able to find it at any pharmacy.  She is requesting a call back.

## 2022-12-27 ENCOUNTER — OFFICE VISIT (OUTPATIENT)
Dept: FAMILY MEDICINE | Facility: CLINIC | Age: 67
End: 2022-12-27
Payer: MEDICARE

## 2022-12-27 VITALS
HEIGHT: 63 IN | WEIGHT: 182 LBS | BODY MASS INDEX: 32.25 KG/M2 | RESPIRATION RATE: 18 BRPM | HEART RATE: 89 BPM | OXYGEN SATURATION: 99 % | SYSTOLIC BLOOD PRESSURE: 124 MMHG | DIASTOLIC BLOOD PRESSURE: 76 MMHG

## 2022-12-27 DIAGNOSIS — E11.9 TYPE 2 DIABETES MELLITUS WITHOUT COMPLICATION, WITHOUT LONG-TERM CURRENT USE OF INSULIN: Primary | ICD-10-CM

## 2022-12-27 DIAGNOSIS — R21 RASH AND NONSPECIFIC SKIN ERUPTION: ICD-10-CM

## 2022-12-27 DIAGNOSIS — H92.03 EAR DISCOMFORT, BILATERAL: ICD-10-CM

## 2022-12-27 DIAGNOSIS — E78.5 DYSLIPIDEMIA: ICD-10-CM

## 2022-12-27 LAB — HBA1C MFR BLD: 6.2 %

## 2022-12-27 PROCEDURE — 99214 PR OFFICE/OUTPT VISIT, EST, LEVL IV, 30-39 MIN: ICD-10-PCS | Mod: S$PBB,AQ,, | Performed by: FAMILY MEDICINE

## 2022-12-27 PROCEDURE — 99214 OFFICE O/P EST MOD 30 MIN: CPT | Mod: S$PBB,AQ,, | Performed by: FAMILY MEDICINE

## 2022-12-27 PROCEDURE — 99215 OFFICE O/P EST HI 40 MIN: CPT | Performed by: FAMILY MEDICINE

## 2022-12-27 PROCEDURE — 83036 HEMOGLOBIN GLYCOSYLATED A1C: CPT | Mod: PBBFAC | Performed by: FAMILY MEDICINE

## 2022-12-27 RX ORDER — METFORMIN HYDROCHLORIDE 1000 MG/1
1000 TABLET ORAL 2 TIMES DAILY WITH MEALS
Qty: 180 TABLET | Refills: 3 | Status: SHIPPED | OUTPATIENT
Start: 2022-12-27 | End: 2023-07-13 | Stop reason: SDUPTHER

## 2022-12-27 RX ORDER — SEMAGLUTIDE 1.34 MG/ML
1 INJECTION, SOLUTION SUBCUTANEOUS
Qty: 3 PEN | Refills: 3 | Status: SHIPPED | OUTPATIENT
Start: 2022-12-27 | End: 2023-04-13

## 2022-12-27 RX ORDER — CLOBETASOL PROPIONATE 0.5 MG/G
OINTMENT TOPICAL
Qty: 60 G | Refills: 2 | Status: SHIPPED | OUTPATIENT
Start: 2022-12-27 | End: 2023-04-13 | Stop reason: SDUPTHER

## 2022-12-27 RX ORDER — ATORVASTATIN CALCIUM 20 MG/1
40 TABLET, FILM COATED ORAL DAILY
Qty: 180 TABLET | Refills: 3 | Status: SHIPPED | OUTPATIENT
Start: 2022-12-27 | End: 2023-04-13 | Stop reason: SDUPTHER

## 2022-12-27 RX ORDER — GLIPIZIDE 10 MG/1
10 TABLET, FILM COATED, EXTENDED RELEASE ORAL
Qty: 90 TABLET | Refills: 3 | Status: SHIPPED | OUTPATIENT
Start: 2022-12-27 | End: 2023-07-13 | Stop reason: SDUPTHER

## 2022-12-27 NOTE — PROGRESS NOTES
SUBJECTIVE:    Patient ID: Lidya Benoit is a 67 y.o. female.    Chief Complaint: Follow-up and Diabetes    HPI  Lidya Benoit is a 67 y.o. female with a hx of NIDDM who comes in for her scheduled follow up.    *DM - a1c today is 6.2% (down from 7.1% three months ago). Currently rx'd Metformin, Ozempic 1mg, Glipizide 10mg. Compliant with meds except has not had Ozempic in one month due to national shortage. Not measuring BG at home. Denies polydipsia, polyuria, vision changes or paresthesias. No chest pain or pressure or shortness of breath. Endorses occasional  hypoglycemic episodes, always related to not having had anything to eat - once or twice a week.     The patient also continues to struggle with body rashes. Currently, she believes that the liquid Metformin may be the culprit and asks for a tab form that she can crush, as she's had this before without issue. She is seeing Dermatology and Allergy/Immunology. Currently being treated with Clobetasol.     At end of visit, she also c/o some pain in both ears. No dizziness, no fevers or chills. No URI sxs otherwise. Does get sinus fullness as well.    Requests various refills.    Office Visit on 12/27/2022   Component Date Value Ref Range Status    Hemoglobin A1C, POC 12/27/2022 6.2  % Final   Office Visit on 09/27/2022   Component Date Value Ref Range Status    Hemoglobin A1C, POC 09/27/2022 7.1  % Final   Office Visit on 08/24/2022   Component Date Value Ref Range Status    CULTURE, AEROBIC AND ANAEROBIC 08/24/2022  (A)   Final   Lab Visit on 07/25/2022   Component Date Value Ref Range Status    Lime 07/25/2022 <0.10  Class 0 kU/L Final    Avocado 07/25/2022 <0.10  Class 0 kU/L Final    Lemon 07/25/2022 <0.10  Class 0 kU/L Final       Past Medical History:   Diagnosis Date    Diabetes mellitus, type 2     Lupus      History reviewed. No pertinent surgical history.  Family History   Problem Relation Age of Onset    Psoriasis Maternal Aunt     Melanoma Neg Hx      Lupus Neg Hx     Eczema Neg Hx        Tobacco History:  reports that she has never smoked. She has never used smokeless tobacco.  Alcohol History:  reports no history of alcohol use.  Drug History:  reports no history of drug use.    Review of patient's allergies indicates:   Allergen Reactions    Lemon balm (lidya officinalis) Rash    Lime Rash    Iodine and iodide containing products     Msg [glutamic acid]        Current Outpatient Medications:     acetaminophen (TYLENOL) 500 MG tablet, , Disp: , Rfl:     camphor-menthol 0.5-0.5% (SARNA ORIGINAL) lotion, Apply topically as needed., Disp: 222 mL, Rfl: 3    cetirizine (ZYRTEC) 10 MG tablet, Take 1 tablet (10 mg total) by mouth 2 (two) times a day., Disp: 180 tablet, Rfl: 3    EPINEPHrine (EPIPEN 2-MARCIA) 0.3 mg/0.3 mL AtIn, Inject contents of one syringe into muscle at first sign of severe allergic reaction., Disp: 1 each, Rfl: 2    famotidine (PEPCID) 20 MG tablet, Take 1 tablet (20 mg total) by mouth 2 (two) times daily., Disp: 180 tablet, Rfl: 3    fluticasone propionate (FLONASE) 50 mcg/actuation nasal spray, 2 sprays (100 mcg total) by Each Nostril route 2 (two) times daily as needed for Rhinitis or Allergies., Disp: 13 g, Rfl: 2    hydrocortisone 2.5 % cream, Apply topically 2 (two) times daily., Disp: 453 g, Rfl: 1    hydrOXYzine HCL (ATARAX) 25 MG tablet, Take 1 tablet (25 mg total) by mouth nightly as needed for Itching. Caution drowsiness, Disp: 30 tablet, Rfl: 2    naproxen (NAPROSYN) 500 MG tablet, Take 1 tablet (500 mg total) by mouth 2 (two) times daily as needed (pain). With meals, Disp: 90 tablet, Rfl: 1    pantoprazole (PROTONIX) 40 mg suspension, MIX 1 PACKET INTO 8OZ OF FLUID AND DRINK BY MOUTH EVERY DAY, Disp: , Rfl:     predniSONE (DELTASONE) 5 MG tablet, Take 1 tablet (5 mg total) by mouth once daily., Disp: 5 tablet, Rfl: 0    tacrolimus (PROTOPIC) 0.1 % ointment, Apply topically 2 (two) times daily., Disp: 100 g, Rfl: 3    triamcinolone  "acetonide 0.1% (KENALOG) 0.1 % cream, Apply twice daily to milder rashes only as needed for 3 wks or less., Disp: 453 g, Rfl: 1    urea (CARMOL) 40 % Crea, , Disp: , Rfl:     atorvastatin (LIPITOR) 20 MG tablet, Take 2 tablets (40 mg total) by mouth once daily., Disp: 180 tablet, Rfl: 3    clobetasol 0.05% (TEMOVATE) 0.05 % Oint, Apply twice a day to affected areas for no more than 2-3 wks, Disp: 60 g, Rfl: 2    glipiZIDE (GLUCOTROL) 10 MG TR24, Take 1 tablet (10 mg total) by mouth daily with breakfast., Disp: 90 tablet, Rfl: 3    metFORMIN (GLUCOPHAGE) 1000 MG tablet, Take 1 tablet (1,000 mg total) by mouth 2 (two) times daily with meals., Disp: 180 tablet, Rfl: 3    semaglutide (OZEMPIC) 1 mg/dose (4 mg/3 mL), Inject 1 mg into the skin every 7 days., Disp: 3 pen, Rfl: 3    Review of Systems   Constitutional:  Negative for activity change and unexpected weight change.   HENT:  Negative for hearing loss, rhinorrhea and trouble swallowing.    Eyes:  Negative for discharge and visual disturbance.   Respiratory:  Negative for chest tightness and wheezing.    Cardiovascular:  Negative for chest pain and palpitations.   Gastrointestinal:  Negative for blood in stool, constipation, diarrhea and vomiting.   Endocrine: Negative for polydipsia and polyuria.   Genitourinary:  Negative for difficulty urinating, dysuria, hematuria and menstrual problem.   Musculoskeletal:  Negative for arthralgias, joint swelling and neck pain.   Neurological:  Negative for weakness and headaches.   Psychiatric/Behavioral:  Negative for confusion and dysphoric mood.    As in HPI           Objective:      Vitals:    12/27/22 0804   BP: 124/76   Pulse: 89   Resp: 18   SpO2: 99%   Weight: 82.6 kg (182 lb)   Height: 5' 3" (1.6 m)     Physical Exam  Vitals reviewed.   Constitutional:       General: She is not in acute distress.  HENT:      Right Ear: Tympanic membrane, ear canal and external ear normal. There is no impacted cerumen.      Left Ear: " Tympanic membrane, ear canal and external ear normal. There is no impacted cerumen.      Mouth/Throat:      Mouth: Mucous membranes are moist.      Pharynx: Oropharynx is clear.   Cardiovascular:      Rate and Rhythm: Normal rate and regular rhythm.      Pulses: Normal pulses.      Heart sounds: Normal heart sounds.   Pulmonary:      Effort: Pulmonary effort is normal.      Breath sounds: Normal breath sounds.   Musculoskeletal:      Cervical back: Neck supple.      Right lower leg: No edema.      Left lower leg: No edema.   Lymphadenopathy:      Cervical: No cervical adenopathy.   Skin:     General: Skin is warm and dry.      Findings: Rash (maculopapular lesions throughout body with excoriations. Spares palms.) present.   Neurological:      General: No focal deficit present.      Mental Status: She is alert and oriented to person, place, and time.   Psychiatric:         Mood and Affect: Mood normal.         Behavior: Behavior normal.            Assessment:       1. Type 2 diabetes mellitus without complication, without long-term current use of insulin    2. Dyslipidemia    3. Rash and nonspecific skin eruption    4. Ear discomfort, bilateral             Plan:       Type 2 diabetes mellitus without complication, without long-term current use of insulin  -     Hemoglobin A1C, POCT  -     metFORMIN (GLUCOPHAGE) 1000 MG tablet; Take 1 tablet (1,000 mg total) by mouth 2 (two) times daily with meals.  Dispense: 180 tablet; Refill: 3  -     atorvastatin (LIPITOR) 20 MG tablet; Take 2 tablets (40 mg total) by mouth once daily.  Dispense: 180 tablet; Refill: 3  -     semaglutide (OZEMPIC) 1 mg/dose (4 mg/3 mL); Inject 1 mg into the skin every 7 days.  Dispense: 3 pen; Refill: 3  -     glipiZIDE (GLUCOTROL) 10 MG TR24; Take 1 tablet (10 mg total) by mouth daily with breakfast.  Dispense: 90 tablet; Refill: 3    Dyslipidemia  -     atorvastatin (LIPITOR) 20 MG tablet; Take 2 tablets (40 mg total) by mouth once daily.   Dispense: 180 tablet; Refill: 3    Rash and nonspecific skin eruption  -     clobetasol 0.05% (TEMOVATE) 0.05 % Oint; Apply twice a day to affected areas for no more than 2-3 wks  Dispense: 60 g; Refill: 2    Ear discomfort, bilateral    - A1c tremendously improved from 10+% earlier this year. Continue current regimen including GLP1RA (pt states received call from pharmacy that is is now available for ). Discussed will likely be able to lower Glipizide dose, but will need to check BG at home to decipher proper adjustment. Hypoglycemia precautions reviewed.  - Encouraged to follow up as scheduled with Derm and Allergy re: body rash. Will refill Clobetasol as requested. Reviewed importance of not using for more than a couple of weeks at a time.  - Ear exam wnl. Patient will resume home care for sinus issues and RTC if symptoms worsen.    Follow up in about 3 months (around 3/27/2023) for DM.        12/28/2022 Cheryle Jones M.D.

## 2023-04-13 ENCOUNTER — OFFICE VISIT (OUTPATIENT)
Dept: FAMILY MEDICINE | Facility: CLINIC | Age: 68
End: 2023-04-13
Payer: MEDICARE

## 2023-04-13 VITALS
DIASTOLIC BLOOD PRESSURE: 74 MMHG | RESPIRATION RATE: 18 BRPM | HEIGHT: 63 IN | SYSTOLIC BLOOD PRESSURE: 126 MMHG | OXYGEN SATURATION: 98 % | HEART RATE: 78 BPM | BODY MASS INDEX: 32.78 KG/M2 | WEIGHT: 185 LBS

## 2023-04-13 DIAGNOSIS — E11.9 TYPE 2 DIABETES MELLITUS WITHOUT COMPLICATION, WITHOUT LONG-TERM CURRENT USE OF INSULIN: Primary | ICD-10-CM

## 2023-04-13 DIAGNOSIS — E78.5 DYSLIPIDEMIA: ICD-10-CM

## 2023-04-13 DIAGNOSIS — R21 RASH AND NONSPECIFIC SKIN ERUPTION: ICD-10-CM

## 2023-04-13 LAB — HBA1C MFR BLD: 6.6 %

## 2023-04-13 PROCEDURE — 99214 PR OFFICE/OUTPT VISIT, EST, LEVL IV, 30-39 MIN: ICD-10-PCS | Mod: S$PBB,AQ,, | Performed by: FAMILY MEDICINE

## 2023-04-13 PROCEDURE — 83036 HEMOGLOBIN GLYCOSYLATED A1C: CPT | Mod: PBBFAC | Performed by: FAMILY MEDICINE

## 2023-04-13 PROCEDURE — 99214 OFFICE O/P EST MOD 30 MIN: CPT | Mod: S$PBB,AQ,, | Performed by: FAMILY MEDICINE

## 2023-04-13 PROCEDURE — 99215 OFFICE O/P EST HI 40 MIN: CPT | Performed by: FAMILY MEDICINE

## 2023-04-13 RX ORDER — SEMAGLUTIDE 2.68 MG/ML
2 INJECTION, SOLUTION SUBCUTANEOUS
Qty: 9 ML | Refills: 3 | Status: SHIPPED | OUTPATIENT
Start: 2023-04-13 | End: 2023-07-13 | Stop reason: SDUPTHER

## 2023-04-13 RX ORDER — CLOBETASOL PROPIONATE 0.5 MG/G
OINTMENT TOPICAL
Qty: 60 G | Refills: 2 | Status: SHIPPED | OUTPATIENT
Start: 2023-04-13 | End: 2023-07-13

## 2023-04-13 RX ORDER — ATORVASTATIN CALCIUM 20 MG/1
40 TABLET, FILM COATED ORAL DAILY
Qty: 180 TABLET | Refills: 3 | Status: SHIPPED | OUTPATIENT
Start: 2023-04-13 | End: 2023-07-13 | Stop reason: SDUPTHER

## 2023-04-13 NOTE — PROGRESS NOTES
SUBJECTIVE:    Patient ID: Lidya Benoit is a 67 y.o. female.    Chief Complaint: Follow-up and Diabetes    HPI  Lidya Benoit is a 67 y.o. female here for her scheduled follow up on diabetes.    *DM - a1c 6.6% (slightly elevated from 6.2% three months ago and 7.1% before that). Currently on Metformin, Ozempic 1mg (has had a difficult time filling it due to pharmacy shortage), and Glipizide 10mg. No polydipsia, no polyuria, +vision changes (not as crisp anymore), and no paresthesias. No chest pain or pressure or shortness of breath. No hypoglycemic episodes. Not craving sweets anymore, appetite is less, portions down but not losing weight. Will be getting eye exam soon.     The patient continues to have body rashes and is seeing Drs Yasmani (Allergy and Immunology) and Nikky (Dermatology). Today, she also c/o vulvovaginal itching that began recently. Denies discharge. No recent antibiotics or glucocorticoids.     Requests refill on topical Clobetasol.    HM: CRC, DEXA - discussed today, but patient declines. States she might consider next OV.      Office Visit on 04/13/2023   Component Date Value Ref Range Status    Hemoglobin A1C, POC 04/13/2023 6.6  % Final   Office Visit on 12/27/2022   Component Date Value Ref Range Status    Hemoglobin A1C, POC 12/27/2022 6.2  % Final       Past Medical History:   Diagnosis Date    Diabetes mellitus, type 2     Lupus      History reviewed. No pertinent surgical history.  Family History   Problem Relation Age of Onset    Psoriasis Maternal Aunt     Melanoma Neg Hx     Lupus Neg Hx     Eczema Neg Hx        Tobacco History:  reports that she has never smoked. She has never used smokeless tobacco.  Alcohol History:  reports no history of alcohol use.  Drug History:  reports no history of drug use.    Review of patient's allergies indicates:   Allergen Reactions    Lemon balm (lidya officinalis) Rash    Lime Rash    Iodine and iodide containing products     Msg [glutamic acid]         Current Outpatient Medications:     acetaminophen (TYLENOL) 500 MG tablet, , Disp: , Rfl:     camphor-menthol 0.5-0.5% (SARNA ORIGINAL) lotion, Apply topically as needed., Disp: 222 mL, Rfl: 3    cetirizine (ZYRTEC) 10 MG tablet, Take 1 tablet (10 mg total) by mouth 2 (two) times a day., Disp: 180 tablet, Rfl: 3    EPINEPHrine (EPIPEN 2-MARCIA) 0.3 mg/0.3 mL AtIn, Inject contents of one syringe into muscle at first sign of severe allergic reaction., Disp: 1 each, Rfl: 2    famotidine (PEPCID) 20 MG tablet, Take 1 tablet (20 mg total) by mouth 2 (two) times daily., Disp: 180 tablet, Rfl: 3    fluticasone propionate (FLONASE) 50 mcg/actuation nasal spray, 2 sprays (100 mcg total) by Each Nostril route 2 (two) times daily as needed for Rhinitis or Allergies., Disp: 13 g, Rfl: 2    glipiZIDE (GLUCOTROL) 10 MG TR24, Take 1 tablet (10 mg total) by mouth daily with breakfast., Disp: 90 tablet, Rfl: 3    hydrocortisone 2.5 % cream, Apply topically 2 (two) times daily., Disp: 453 g, Rfl: 1    hydrOXYzine HCL (ATARAX) 25 MG tablet, Take 1 tablet (25 mg total) by mouth nightly as needed for Itching. Caution drowsiness, Disp: 30 tablet, Rfl: 2    metFORMIN (GLUCOPHAGE) 1000 MG tablet, Take 1 tablet (1,000 mg total) by mouth 2 (two) times daily with meals., Disp: 180 tablet, Rfl: 3    naproxen (NAPROSYN) 500 MG tablet, Take 1 tablet (500 mg total) by mouth 2 (two) times daily as needed (pain). With meals, Disp: 90 tablet, Rfl: 1    pantoprazole (PROTONIX) 40 mg suspension, MIX 1 PACKET INTO 8OZ OF FLUID AND DRINK BY MOUTH EVERY DAY, Disp: , Rfl:     predniSONE (DELTASONE) 5 MG tablet, Take 1 tablet (5 mg total) by mouth once daily., Disp: 5 tablet, Rfl: 0    tacrolimus (PROTOPIC) 0.1 % ointment, Apply topically 2 (two) times daily., Disp: 100 g, Rfl: 3    triamcinolone acetonide 0.1% (KENALOG) 0.1 % cream, Apply twice daily to milder rashes only as needed for 3 wks or less., Disp: 453 g, Rfl: 1    urea (CARMOL) 40 % Crea, ,  "Disp: , Rfl:     atorvastatin (LIPITOR) 20 MG tablet, Take 2 tablets (40 mg total) by mouth once daily., Disp: 180 tablet, Rfl: 3    clobetasol 0.05% (TEMOVATE) 0.05 % Oint, Apply twice a day to affected areas for no more than 2-3 wks, Disp: 60 g, Rfl: 2    semaglutide (OZEMPIC) 2 mg/dose (8 mg/3 mL) PnIj, Inject 2 mg into the skin every 7 days., Disp: 9 mL, Rfl: 3    Review of Systems  As in HPI           Objective:      Vitals:    04/13/23 1115   BP: 126/74   Pulse: 78   Resp: 18   SpO2: 98%   Weight: 83.9 kg (185 lb)   Height: 5' 3" (1.6 m)     Physical Exam         Assessment:       1. Type 2 diabetes mellitus without complication, without long-term current use of insulin    2. Rash and nonspecific skin eruption    3. Dyslipidemia             Plan:       Type 2 diabetes mellitus without complication, without long-term current use of insulin  -     Hemoglobin A1C, POCT  -     Hemoglobin A1C; Future; Expected date: 04/13/2023  -     Microalbumin/Creatinine Ratio, Urine; Future; Expected date: 04/13/2023  -     Comprehensive Metabolic Panel; Future; Expected date: 04/13/2023  -     Lipid Panel; Future; Expected date: 04/13/2023  -     semaglutide (OZEMPIC) 2 mg/dose (8 mg/3 mL) PnIj; Inject 2 mg into the skin every 7 days.  Dispense: 9 mL; Refill: 3  -     atorvastatin (LIPITOR) 20 MG tablet; Take 2 tablets (40 mg total) by mouth once daily.  Dispense: 180 tablet; Refill: 3    Rash and nonspecific skin eruption  -     clobetasol 0.05% (TEMOVATE) 0.05 % Oint; Apply twice a day to affected areas for no more than 2-3 wks  Dispense: 60 g; Refill: 2    Dyslipidemia  -     atorvastatin (LIPITOR) 20 MG tablet; Take 2 tablets (40 mg total) by mouth once daily.  Dispense: 180 tablet; Refill: 3    - A1c slightly increased but still well controlled. Continue current regimen. Will likely improve further once able to use Ozempic consistently, at which time might also be able to decrease dose of additional antihyperglycemics.  - " Will treat empirically for yeast infection with single dose of Fluconazole per pt request. If does not resolve, will need further eval.  - Labs before next OV.  - Follow up in 3 months or sooner prn.    No follow-ups on file.        4/16/2023 Cheryle Jones M.D.

## 2023-04-16 PROBLEM — R51.9 ACUTE NONINTRACTABLE HEADACHE: Status: RESOLVED | Noted: 2021-12-03 | Resolved: 2023-04-16

## 2023-04-16 PROBLEM — H65.193 ACUTE MUCOID OTITIS MEDIA OF BOTH EARS: Status: RESOLVED | Noted: 2021-12-03 | Resolved: 2023-04-16

## 2023-04-16 PROBLEM — H65.113 ACUTE MUCOID OTITIS MEDIA OF BOTH EARS: Status: RESOLVED | Noted: 2021-12-03 | Resolved: 2023-04-16

## 2023-06-29 ENCOUNTER — PATIENT MESSAGE (OUTPATIENT)
Dept: FAMILY MEDICINE | Facility: CLINIC | Age: 68
End: 2023-06-29

## 2023-07-11 LAB
ALBUMIN SERPL-MCNC: 4.6 G/DL (ref 3.9–4.9)
ALBUMIN/CREAT UR: 12 MG/G CREAT (ref 0–29)
ALBUMIN/GLOB SERPL: 1.6 {RATIO} (ref 1.2–2.2)
ALP SERPL-CCNC: 114 IU/L (ref 44–121)
ALT SERPL-CCNC: 28 IU/L (ref 0–32)
AST SERPL-CCNC: 24 IU/L (ref 0–40)
BILIRUB SERPL-MCNC: 0.6 MG/DL (ref 0–1.2)
BUN SERPL-MCNC: 16 MG/DL (ref 8–27)
BUN/CREAT SERPL: 17 (ref 12–28)
CALCIUM SERPL-MCNC: 9.7 MG/DL (ref 8.7–10.3)
CHLORIDE SERPL-SCNC: 104 MMOL/L (ref 96–106)
CHOLEST SERPL-MCNC: 153 MG/DL (ref 100–199)
CO2 SERPL-SCNC: 24 MMOL/L (ref 20–29)
CREAT SERPL-MCNC: 0.94 MG/DL (ref 0.57–1)
CREAT UR-MCNC: 328.7 MG/DL
EST. GFR  (NO RACE VARIABLE): 67 ML/MIN/1.73
GLOBULIN SER CALC-MCNC: 2.9 G/DL (ref 1.5–4.5)
GLUCOSE SERPL-MCNC: 158 MG/DL (ref 70–99)
HBA1C MFR BLD: 6.9 % (ref 4.8–5.6)
HDLC SERPL-MCNC: 46 MG/DL
LDLC SERPL CALC-MCNC: 81 MG/DL (ref 0–99)
MICROALBUMIN UR-MCNC: 40.3 UG/ML
POTASSIUM SERPL-SCNC: 4.6 MMOL/L (ref 3.5–5.2)
PROT SERPL-MCNC: 7.5 G/DL (ref 6–8.5)
SODIUM SERPL-SCNC: 143 MMOL/L (ref 134–144)
TRIGL SERPL-MCNC: 151 MG/DL (ref 0–149)
VLDLC SERPL CALC-MCNC: 26 MG/DL (ref 5–40)

## 2023-07-13 ENCOUNTER — OFFICE VISIT (OUTPATIENT)
Dept: FAMILY MEDICINE | Facility: CLINIC | Age: 68
End: 2023-07-13
Payer: MEDICARE

## 2023-07-13 VITALS
BODY MASS INDEX: 33.72 KG/M2 | HEIGHT: 63 IN | DIASTOLIC BLOOD PRESSURE: 77 MMHG | WEIGHT: 190.31 LBS | OXYGEN SATURATION: 99 % | SYSTOLIC BLOOD PRESSURE: 135 MMHG | HEART RATE: 73 BPM

## 2023-07-13 DIAGNOSIS — R21 RASH AND NONSPECIFIC SKIN ERUPTION: ICD-10-CM

## 2023-07-13 DIAGNOSIS — T78.3XXA ANGIOEDEMA, INITIAL ENCOUNTER: ICD-10-CM

## 2023-07-13 DIAGNOSIS — Z12.31 BREAST CANCER SCREENING BY MAMMOGRAM: ICD-10-CM

## 2023-07-13 DIAGNOSIS — Z78.0 POST-MENOPAUSAL: ICD-10-CM

## 2023-07-13 DIAGNOSIS — B35.3 TINEA PEDIS OF BOTH FEET: ICD-10-CM

## 2023-07-13 DIAGNOSIS — E11.9 TYPE 2 DIABETES MELLITUS WITHOUT COMPLICATION, WITHOUT LONG-TERM CURRENT USE OF INSULIN: Primary | ICD-10-CM

## 2023-07-13 DIAGNOSIS — E78.5 DYSLIPIDEMIA: ICD-10-CM

## 2023-07-13 PROCEDURE — 99214 OFFICE O/P EST MOD 30 MIN: CPT | Mod: S$PBB,AQ,, | Performed by: FAMILY MEDICINE

## 2023-07-13 PROCEDURE — 99215 OFFICE O/P EST HI 40 MIN: CPT | Performed by: FAMILY MEDICINE

## 2023-07-13 PROCEDURE — 99214 PR OFFICE/OUTPT VISIT, EST, LEVL IV, 30-39 MIN: ICD-10-PCS | Mod: S$PBB,AQ,, | Performed by: FAMILY MEDICINE

## 2023-07-13 RX ORDER — CETIRIZINE HYDROCHLORIDE 10 MG/1
10 TABLET ORAL DAILY
Qty: 90 TABLET | Refills: 3 | Status: SHIPPED | OUTPATIENT
Start: 2023-07-13 | End: 2024-01-02 | Stop reason: SDUPTHER

## 2023-07-13 RX ORDER — SEMAGLUTIDE 2.68 MG/ML
2 INJECTION, SOLUTION SUBCUTANEOUS
Qty: 9 ML | Refills: 3 | Status: SHIPPED | OUTPATIENT
Start: 2023-07-13 | End: 2024-01-02 | Stop reason: SDUPTHER

## 2023-07-13 RX ORDER — METFORMIN HYDROCHLORIDE 1000 MG/1
1000 TABLET ORAL 2 TIMES DAILY WITH MEALS
Qty: 180 TABLET | Refills: 3 | Status: SHIPPED | OUTPATIENT
Start: 2023-07-13

## 2023-07-13 RX ORDER — CLOBETASOL PROPIONATE 0.5 MG/G
OINTMENT TOPICAL
Qty: 60 G | Refills: 2 | Status: SHIPPED | OUTPATIENT
Start: 2023-07-13 | End: 2024-01-02 | Stop reason: SDUPTHER

## 2023-07-13 RX ORDER — CLOBETASOL PROPIONATE 0.5 MG/ML
LOTION TOPICAL
COMMUNITY
Start: 2022-12-27 | End: 2023-07-13

## 2023-07-13 RX ORDER — CLOTRIMAZOLE 1 %
CREAM (GRAM) TOPICAL 2 TIMES DAILY
Qty: 45 G | Refills: 0 | Status: SHIPPED | OUTPATIENT
Start: 2023-07-13

## 2023-07-13 RX ORDER — GLIPIZIDE 10 MG/1
10 TABLET, FILM COATED, EXTENDED RELEASE ORAL
Qty: 90 TABLET | Refills: 3 | Status: SHIPPED | OUTPATIENT
Start: 2023-07-13 | End: 2024-01-02 | Stop reason: SDUPTHER

## 2023-07-13 RX ORDER — ATORVASTATIN CALCIUM 20 MG/1
40 TABLET, FILM COATED ORAL DAILY
Qty: 180 TABLET | Refills: 3 | Status: SHIPPED | OUTPATIENT
Start: 2023-07-13 | End: 2024-01-02 | Stop reason: SDUPTHER

## 2023-07-13 RX ORDER — ATORVASTATIN CALCIUM 20 MG/1
40 TABLET, FILM COATED ORAL
COMMUNITY
Start: 2022-09-27 | End: 2023-09-26

## 2023-07-13 RX ORDER — FAMOTIDINE 20 MG/1
TABLET, FILM COATED ORAL
COMMUNITY
Start: 2022-11-14 | End: 2023-11-13

## 2023-07-13 NOTE — PATIENT INSTRUCTIONS
For your eye exams, you can try making an appointment with:    [facility] [address] in [place]   [phone]

## 2023-07-13 NOTE — PROGRESS NOTES
SUBJECTIVE:    Patient ID: Lidya Benoit is a 68 y.o. female.    Chief Complaint: lab review and Diabetes    HPI:  Lidya Benoit is a 68 y.o. female with a hx of NIDDM, HLD, Discoid lupus erythematosus, Sleep apnea, Insomnia, and Stress incontinence. Here for scheduled follow up on DM.    *DM - a1c 6.9% (decreased from 6.6% three months ago). Currently on Metformin 1000mg BID, Glipizide 10mg qAM, and Ozempic 2mg weekly. BG at home well controlled. Denies polydipsia, polyuria, vision changes or paresthesias. No hypoglycemic episodes. Microalbumin decreased from a year ago to 40. Lipids as below; taking Atorvastatin 40mg. Had eye exam in [place], told no retinopathy but has beginnings of cataract.     Continues to have allergic reactions w swelling intermittently. Bottom of her feet have been itching, peel with scratching. Has been using Clobetasol with some relief but no resolution.    Needs various refills.    HM due: CRC (will call us back for referral), DXA (ordered today), Mammo (ordered today), foot exam      Office Visit on 04/13/2023   Component Date Value Ref Range Status    Hemoglobin A1C, POC 04/13/2023 6.6  % Final    Hemoglobin A1c 07/10/2023 6.9 (H)  4.8 - 5.6 % Final    Creatinine, Urine 07/10/2023 328.7  Not Estab. mg/dL Final    Microalb, Ur 07/10/2023 40.3  Not Estab. ug/mL Final    Microalb/Crt. Ratio 07/10/2023 12  0 - 29 mg/g creat Final    Glucose 07/10/2023 158 (H)  70 - 99 mg/dL Final    BUN 07/10/2023 16  8 - 27 mg/dL Final    Creatinine 07/10/2023 0.94  0.57 - 1.00 mg/dL Final    eGFR 07/10/2023 67  >59 mL/min/1.73 Final    BUN/Creatinine Ratio 07/10/2023 17  12 - 28 Final    Sodium 07/10/2023 143  134 - 144 mmol/L Final    Potassium 07/10/2023 4.6  3.5 - 5.2 mmol/L Final    Chloride 07/10/2023 104  96 - 106 mmol/L Final    CO2 07/10/2023 24  20 - 29 mmol/L Final    Calcium 07/10/2023 9.7  8.7 - 10.3 mg/dL Final    Protein, Total 07/10/2023 7.5  6.0 - 8.5 g/dL Final    Albumin 07/10/2023  4.6  3.9 - 4.9 g/dL Final    Globulin, Total 07/10/2023 2.9  1.5 - 4.5 g/dL Final    Albumin/Globulin Ratio 07/10/2023 1.6  1.2 - 2.2 Final    Total Bilirubin 07/10/2023 0.6  0.0 - 1.2 mg/dL Final    Alkaline Phosphatase 07/10/2023 114  44 - 121 IU/L Final    AST 07/10/2023 24  0 - 40 IU/L Final    ALT 07/10/2023 28  0 - 32 IU/L Final    Cholesterol 07/10/2023 153  100 - 199 mg/dL Final    Triglycerides 07/10/2023 151 (H)  0 - 149 mg/dL Final    HDL 07/10/2023 46  >39 mg/dL Final    VLDL Cholesterol Christopher 07/10/2023 26  5 - 40 mg/dL Final    LDL Calculated 07/10/2023 81  0 - 99 mg/dL Final       Past Medical History:   Diagnosis Date    Diabetes mellitus, type 2     Lupus      History reviewed. No pertinent surgical history.  Family History   Problem Relation Age of Onset    Psoriasis Maternal Aunt     Melanoma Neg Hx     Lupus Neg Hx     Eczema Neg Hx        Tobacco History:  reports that she has never smoked. She has never used smokeless tobacco.  Alcohol History:  reports no history of alcohol use.  Drug History:  reports no history of drug use.    Review of patient's allergies indicates:   Allergen Reactions    Lemon balm (lidya officinalis) Rash    Lime Rash    Iodine and iodide containing products     Msg [glutamic acid]        Current Outpatient Medications:     atorvastatin (LIPITOR) 20 MG tablet, Take 40 mg by mouth., Disp: , Rfl:     cetirizine 10 mg Cap, 10 mg., Disp: , Rfl:     famotidine (PEPCID) 20 MG tablet,  See Rx Instructions, # 180 EA, 3 total refill(s), Hard Stop, UZMA [Federal Rx: #180 last filled 05/10/23], Disp: , Rfl:     acetaminophen (TYLENOL) 500 MG tablet, , Disp: , Rfl:     atorvastatin (LIPITOR) 20 MG tablet, Take 2 tablets (40 mg total) by mouth once daily., Disp: 180 tablet, Rfl: 3    camphor-menthol 0.5-0.5% (SARNA ORIGINAL) lotion, Apply topically as needed., Disp: 222 mL, Rfl: 3    cetirizine (ZYRTEC) 10 MG tablet, Take 1 tablet (10 mg total) by mouth once daily., Disp: 90 tablet,  Rfl: 3    clobetasol 0.05% (TEMOVATE) 0.05 % Oint, Apply twice a day to affected areas for no more than 2-3 wks, Disp: 60 g, Rfl: 2    clotrimazole (LOTRIMIN) 1 % cream, Apply topically 2 (two) times daily. To affected area on foot soles., Disp: 45 g, Rfl: 0    EPINEPHrine (EPIPEN 2-MARCIA) 0.3 mg/0.3 mL AtIn, Inject contents of one syringe into muscle at first sign of severe allergic reaction., Disp: 1 each, Rfl: 2    famotidine (PEPCID) 20 MG tablet, Take 1 tablet (20 mg total) by mouth 2 (two) times daily., Disp: 180 tablet, Rfl: 3    fluticasone propionate (FLONASE) 50 mcg/actuation nasal spray, 2 sprays (100 mcg total) by Each Nostril route 2 (two) times daily as needed for Rhinitis or Allergies., Disp: 13 g, Rfl: 2    glipiZIDE (GLUCOTROL) 10 MG TR24, Take 1 tablet (10 mg total) by mouth daily with breakfast., Disp: 90 tablet, Rfl: 3    hydrocortisone 2.5 % cream, Apply topically 2 (two) times daily., Disp: 453 g, Rfl: 1    hydrOXYzine HCL (ATARAX) 25 MG tablet, Take 1 tablet (25 mg total) by mouth nightly as needed for Itching. Caution drowsiness, Disp: 30 tablet, Rfl: 2    metFORMIN (GLUCOPHAGE) 1000 MG tablet, Take 1 tablet (1,000 mg total) by mouth 2 (two) times daily with meals., Disp: 180 tablet, Rfl: 3    naproxen (NAPROSYN) 500 MG tablet, Take 1 tablet (500 mg total) by mouth 2 (two) times daily as needed (pain). With meals, Disp: 90 tablet, Rfl: 1    pantoprazole (PROTONIX) 40 mg suspension, MIX 1 PACKET INTO 8OZ OF FLUID AND DRINK BY MOUTH EVERY DAY, Disp: , Rfl:     predniSONE (DELTASONE) 5 MG tablet, Take 1 tablet (5 mg total) by mouth once daily., Disp: 5 tablet, Rfl: 0    semaglutide (OZEMPIC) 2 mg/dose (8 mg/3 mL) PnIj, Inject 2 mg into the skin every 7 days., Disp: 9 mL, Rfl: 3    tacrolimus (PROTOPIC) 0.1 % ointment, Apply topically 2 (two) times daily., Disp: 100 g, Rfl: 3    triamcinolone acetonide 0.1% (KENALOG) 0.1 % cream, Apply twice daily to milder rashes only as needed for 3 wks or less.,  "Disp: 453 g, Rfl: 1    urea (CARMOL) 40 % Crea, , Disp: , Rfl:     ROS:  As in HPI           Objective:      Vitals:    07/13/23 0829   BP: 135/77   Pulse: 73   SpO2: 99%   Weight: 86.3 kg (190 lb 4.8 oz)   Height: 5' 3" (1.6 m)     Physical Exam  Vitals reviewed.   Constitutional:       General: She is not in acute distress.     Appearance: She is not ill-appearing.   Cardiovascular:      Rate and Rhythm: Normal rate and regular rhythm.      Pulses: Normal pulses.      Heart sounds: Normal heart sounds.   Pulmonary:      Effort: Pulmonary effort is normal.      Breath sounds: Normal breath sounds.   Skin:     General: Skin is warm and dry.      Comments: Peeling and excoriations on foot soles bilaterally w no signs of infection.   Neurological:      Mental Status: She is alert and oriented to person, place, and time.   Psychiatric:         Mood and Affect: Mood normal.         Behavior: Behavior normal.            Assessment:       1. Type 2 diabetes mellitus without complication, without long-term current use of insulin    2. Mixed hyperlipidemia    3. Breast cancer screening by mammogram    4. Post-menopausal    5. Tinea pedis of both feet    6. Rash and nonspecific skin eruption    7. Angioedema, initial encounter    8. Dyslipidemia             Plan:       Type 2 diabetes mellitus without complication, without long-term current use of insulin  -     semaglutide (OZEMPIC) 2 mg/dose (8 mg/3 mL) PnIj; Inject 2 mg into the skin every 7 days.  Dispense: 9 mL; Refill: 3  -     metFORMIN (GLUCOPHAGE) 1000 MG tablet; Take 1 tablet (1,000 mg total) by mouth 2 (two) times daily with meals.  Dispense: 180 tablet; Refill: 3  -     atorvastatin (LIPITOR) 20 MG tablet; Take 2 tablets (40 mg total) by mouth once daily.  Dispense: 180 tablet; Refill: 3  -     glipiZIDE (GLUCOTROL) 10 MG TR24; Take 1 tablet (10 mg total) by mouth daily with breakfast.  Dispense: 90 tablet; Refill: 3    Mixed hyperlipidemia    Breast cancer " screening by mammogram  -     Mammo Digital Screening Bilat w/ Baron; Future; Expected date: 07/13/2023    Post-menopausal  -     DXA Bone Density Axial Skeleton 1 or more sites; Future; Expected date: 07/13/2023    Tinea pedis of both feet  -     clotrimazole (LOTRIMIN) 1 % cream; Apply topically 2 (two) times daily. To affected area on foot soles.  Dispense: 45 g; Refill: 0    Rash and nonspecific skin eruption  -     clobetasol 0.05% (TEMOVATE) 0.05 % Oint; Apply twice a day to affected areas for no more than 2-3 wks  Dispense: 60 g; Refill: 2  -     cetirizine (ZYRTEC) 10 MG tablet; Take 1 tablet (10 mg total) by mouth once daily.  Dispense: 90 tablet; Refill: 3    Angioedema, initial encounter  -     cetirizine (ZYRTEC) 10 MG tablet; Take 1 tablet (10 mg total) by mouth once daily.  Dispense: 90 tablet; Refill: 3    Dyslipidemia  -     atorvastatin (LIPITOR) 20 MG tablet; Take 2 tablets (40 mg total) by mouth once daily.  Dispense: 180 tablet; Refill: 3      - Discussed recent labs.  - DM remains well controlled. Continue current management.  - HLD well controlled. Continue statin.  - Foot lesions appear to be fungal; will try Clotrimazole. If no improvement or resolution, reevaluate or refer to Derm.  - Various refills issued as above.  - Will request eye exam records from [place].  - Discussed outstanding health maintenance. Agrees to mammogram and bone density tests. Will contact us at a later time for referral to colonoscopy; encouraged to do asap and reviewed risks vs benefits of screening for CRC.      F/u 3 months    No follow-ups on file.        7/13/2023 Cheryle Jones M.D.

## 2023-07-14 ENCOUNTER — HOSPITAL ENCOUNTER (OUTPATIENT)
Dept: RADIOLOGY | Facility: HOSPITAL | Age: 68
Discharge: HOME | End: 2023-07-14
Attending: FAMILY MEDICINE
Payer: MEDICARE

## 2023-07-14 DIAGNOSIS — Z78.0 POST-MENOPAUSAL: ICD-10-CM

## 2023-07-14 DIAGNOSIS — Z12.31 BREAST CANCER SCREENING BY MAMMOGRAM: ICD-10-CM

## 2023-07-14 PROCEDURE — 77067 SCR MAMMO BI INCL CAD: CPT | Mod: TC,PO

## 2023-07-14 PROCEDURE — 77080 DXA BONE DENSITY AXIAL: CPT | Mod: TC,PO

## 2023-10-11 ENCOUNTER — PATIENT MESSAGE (OUTPATIENT)
Dept: FAMILY MEDICINE | Facility: CLINIC | Age: 68
End: 2023-10-11

## 2023-12-19 ENCOUNTER — PATIENT MESSAGE (OUTPATIENT)
Dept: ADMINISTRATIVE | Facility: HOSPITAL | Age: 68
End: 2023-12-19
Payer: MEDICARE

## 2023-12-20 DIAGNOSIS — Z12.11 SCREENING FOR COLON CANCER: ICD-10-CM

## 2024-01-02 ENCOUNTER — OFFICE VISIT (OUTPATIENT)
Dept: FAMILY MEDICINE | Facility: CLINIC | Age: 69
End: 2024-01-02
Payer: MEDICARE

## 2024-01-02 VITALS
HEART RATE: 88 BPM | TEMPERATURE: 98 F | WEIGHT: 188.31 LBS | DIASTOLIC BLOOD PRESSURE: 70 MMHG | SYSTOLIC BLOOD PRESSURE: 128 MMHG | BODY MASS INDEX: 33.37 KG/M2 | RESPIRATION RATE: 20 BRPM | HEIGHT: 63 IN

## 2024-01-02 DIAGNOSIS — T78.3XXA ANGIOEDEMA, INITIAL ENCOUNTER: ICD-10-CM

## 2024-01-02 DIAGNOSIS — E11.9 TYPE 2 DIABETES MELLITUS WITHOUT COMPLICATION, WITHOUT LONG-TERM CURRENT USE OF INSULIN: ICD-10-CM

## 2024-01-02 DIAGNOSIS — T78.40XD ALLERGIC REACTION, SUBSEQUENT ENCOUNTER: ICD-10-CM

## 2024-01-02 DIAGNOSIS — R30.0 DYSURIA: Primary | ICD-10-CM

## 2024-01-02 DIAGNOSIS — E78.5 DYSLIPIDEMIA: ICD-10-CM

## 2024-01-02 DIAGNOSIS — R21 RASH AND NONSPECIFIC SKIN ERUPTION: ICD-10-CM

## 2024-01-02 LAB
BILIRUB UR QL STRIP: NEGATIVE
GLUCOSE UR QL STRIP: NEGATIVE
KETONES UR QL STRIP: NEGATIVE
LEUKOCYTE ESTERASE UR QL STRIP: POSITIVE
PH, POC UA: 5.5 (ref 5–8.5)
POC BLOOD, URINE: NEGATIVE
POC NITRATES, URINE: NEGATIVE
PROT UR QL STRIP: NEGATIVE
SP GR UR STRIP: 1.02 (ref 1–1.03)
UROBILINOGEN UR STRIP-ACNC: ABNORMAL (ref 0.2–8)

## 2024-01-02 PROCEDURE — 99999 PR PBB SHADOW E&M-EST. PATIENT-LVL IV: CPT | Mod: PBBFAC,,, | Performed by: NURSE PRACTITIONER

## 2024-01-02 PROCEDURE — 99999PBSHW POCT URINALYSIS, DIPSTICK, AUTOMATED, W/O SCOPE: Mod: PBBFAC,,,

## 2024-01-02 PROCEDURE — 99213 OFFICE O/P EST LOW 20 MIN: CPT | Mod: S$PBB,,, | Performed by: NURSE PRACTITIONER

## 2024-01-02 PROCEDURE — 81003 URINALYSIS AUTO W/O SCOPE: CPT | Mod: PBBFAC,PN | Performed by: NURSE PRACTITIONER

## 2024-01-02 PROCEDURE — 99214 OFFICE O/P EST MOD 30 MIN: CPT | Mod: PBBFAC,PN | Performed by: NURSE PRACTITIONER

## 2024-01-02 RX ORDER — NITROFURANTOIN 25; 75 MG/1; MG/1
100 CAPSULE ORAL 2 TIMES DAILY
Qty: 10 CAPSULE | Refills: 0 | Status: SHIPPED | OUTPATIENT
Start: 2024-01-02 | End: 2024-01-07

## 2024-01-02 RX ORDER — FAMOTIDINE 20 MG/1
20 TABLET, FILM COATED ORAL 2 TIMES DAILY
Qty: 180 TABLET | Refills: 3 | Status: SHIPPED | OUTPATIENT
Start: 2024-01-02 | End: 2025-01-01

## 2024-01-02 RX ORDER — CETIRIZINE HYDROCHLORIDE 10 MG/1
10 TABLET ORAL DAILY
Qty: 90 TABLET | Refills: 3 | Status: SHIPPED | OUTPATIENT
Start: 2024-01-02 | End: 2024-01-02 | Stop reason: SDUPTHER

## 2024-01-02 RX ORDER — CETIRIZINE HYDROCHLORIDE 10 MG/1
10 TABLET ORAL DAILY
Qty: 90 TABLET | Refills: 3 | Status: SHIPPED | OUTPATIENT
Start: 2024-01-02 | End: 2025-01-01

## 2024-01-02 RX ORDER — CLOBETASOL PROPIONATE 0.5 MG/G
OINTMENT TOPICAL
Qty: 60 G | Refills: 2 | Status: SHIPPED | OUTPATIENT
Start: 2024-01-02

## 2024-01-02 RX ORDER — SEMAGLUTIDE 2.68 MG/ML
2 INJECTION, SOLUTION SUBCUTANEOUS
Qty: 9 ML | Refills: 3 | Status: SHIPPED | OUTPATIENT
Start: 2024-01-02 | End: 2024-01-02 | Stop reason: SDUPTHER

## 2024-01-02 RX ORDER — SEMAGLUTIDE 2.68 MG/ML
2 INJECTION, SOLUTION SUBCUTANEOUS
Qty: 9 ML | Refills: 3 | Status: SHIPPED | OUTPATIENT
Start: 2024-01-02

## 2024-01-02 RX ORDER — GLIPIZIDE 10 MG/1
10 TABLET, FILM COATED, EXTENDED RELEASE ORAL
Qty: 90 TABLET | Refills: 3 | Status: SHIPPED | OUTPATIENT
Start: 2024-01-02 | End: 2024-01-02 | Stop reason: SDUPTHER

## 2024-01-02 RX ORDER — GLIPIZIDE 10 MG/1
10 TABLET, FILM COATED, EXTENDED RELEASE ORAL
Qty: 90 TABLET | Refills: 3 | Status: SHIPPED | OUTPATIENT
Start: 2024-01-02 | End: 2025-01-01

## 2024-01-02 RX ORDER — ATORVASTATIN CALCIUM 20 MG/1
40 TABLET, FILM COATED ORAL DAILY
Qty: 180 TABLET | Refills: 3 | Status: SHIPPED | OUTPATIENT
Start: 2024-01-02 | End: 2024-01-02 | Stop reason: SDUPTHER

## 2024-01-02 RX ORDER — ATORVASTATIN CALCIUM 20 MG/1
40 TABLET, FILM COATED ORAL DAILY
Qty: 180 TABLET | Refills: 3 | Status: SHIPPED | OUTPATIENT
Start: 2024-01-02 | End: 2025-01-01

## 2024-01-02 RX ORDER — EPINEPHRINE 0.3 MG/.3ML
INJECTION SUBCUTANEOUS
Qty: 1 EACH | Refills: 2 | Status: SHIPPED | OUTPATIENT
Start: 2024-01-02

## 2024-01-02 RX ORDER — CLOBETASOL PROPIONATE 0.5 MG/G
OINTMENT TOPICAL
Qty: 60 G | Refills: 2 | Status: SHIPPED | OUTPATIENT
Start: 2024-01-02 | End: 2024-01-02 | Stop reason: SDUPTHER

## 2024-01-02 NOTE — PROGRESS NOTES
Patient ID: Lidya Benoit is a 68 y.o. female.    Chief Complaint: Follow-up (67 yo female here for recheck DM/HLD. Pt states concern of odor with vaginal discomfort  times 3 weeks. Pt states yellowish mucus discharge but no report of fever or chills. KM)    Ms. Benoit is a very pleasant patient of Dr. Grant who presents today for medication refills. She states she has also been having some urinary symptoms as of late and would like to be evaluated. She denies any major interval changes. She is also in need of updated blood work.     Diabetes  She has type 2 diabetes mellitus. No MedicAlert identification noted. Hypoglycemia symptoms include headaches, hunger, mood changes and nervousness/anxiousness. Pertinent negatives for hypoglycemia include no confusion, dizziness, pallor, seizures, sleepiness, speech difficulty, sweats or tremors. Associated symptoms include blurred vision, fatigue, polyphagia, visual change and weakness. Pertinent negatives for diabetes include no chest pain, no foot paresthesias, no foot ulcerations, no polydipsia, no polyuria and no weight loss. Pertinent negatives for hypoglycemia complications include no blackouts, no hospitalization, no nocturnal hypoglycemia, no required assistance and no required glucagon injection. Symptoms are stable. Pertinent negatives for diabetic complications include no autonomic neuropathy, CVA, heart disease, nephropathy, peripheral neuropathy, PVD or retinopathy. There are no known risk factors, family history, obesity, stress and diabetes mellitus for coronary artery disease. Risk factors for coronary artery disease include no known risk factors, family history, obesity, stress and diabetes mellitus. Current diabetic treatment includes insulin injections and oral agent (triple therapy). She is compliant with treatment most of the time. She is currently taking insulin pre-dinner. Insulin injections are given by significant other. Rotation sites for  injection include the abdominal wall. Her weight is stable. She is following a generally healthy diet. Meal planning includes avoidance of concentrated sweets. She has not had a previous visit with a dietitian. She participates in exercise daily. She monitors blood glucose at home 1-2 x per week. She monitors urine at home <1 x per month. Blood glucose monitoring compliance is good. There is no change in her home blood glucose trend. She does not see a podiatrist.Eye exam is current.           Past Medical History:   Diagnosis Date    Diabetes mellitus, type 2     Lupus      History reviewed. No pertinent surgical history.      Tobacco History:  reports that she has never smoked. She has never been exposed to tobacco smoke. She has never used smokeless tobacco.      Review of patient's allergies indicates:   Allergen Reactions    Covid-19 vacc,mrna(pfizer)(pf) Swelling    Lemon balm (lidya officinalis) Rash    Lime Rash    Iodine and iodide containing products     Msg [glutamic acid]        Current Outpatient Medications:     acetaminophen (TYLENOL) 500 MG tablet, , Disp: , Rfl:     camphor-menthol 0.5-0.5% (SARNA ORIGINAL) lotion, Apply topically as needed., Disp: 222 mL, Rfl: 3    clotrimazole (LOTRIMIN) 1 % cream, Apply topically 2 (two) times daily. To affected area on foot soles., Disp: 45 g, Rfl: 0    fluticasone propionate (FLONASE) 50 mcg/actuation nasal spray, 2 sprays (100 mcg total) by Each Nostril route 2 (two) times daily as needed for Rhinitis or Allergies., Disp: 13 g, Rfl: 2    hydrocortisone 2.5 % cream, Apply topically 2 (two) times daily., Disp: 453 g, Rfl: 1    hydrOXYzine HCL (ATARAX) 25 MG tablet, Take 1 tablet (25 mg total) by mouth nightly as needed for Itching. Caution drowsiness, Disp: 30 tablet, Rfl: 2    metFORMIN (GLUCOPHAGE) 1000 MG tablet, Take 1 tablet (1,000 mg total) by mouth 2 (two) times daily with meals., Disp: 180 tablet, Rfl: 3    naproxen (NAPROSYN) 500 MG tablet, Take 1  tablet (500 mg total) by mouth 2 (two) times daily as needed (pain). With meals, Disp: 90 tablet, Rfl: 1    pantoprazole (PROTONIX) 40 mg suspension, MIX 1 PACKET INTO 8OZ OF FLUID AND DRINK BY MOUTH EVERY DAY, Disp: , Rfl:     predniSONE (DELTASONE) 5 MG tablet, Take 1 tablet (5 mg total) by mouth once daily., Disp: 5 tablet, Rfl: 0    tacrolimus (PROTOPIC) 0.1 % ointment, Apply topically 2 (two) times daily., Disp: 100 g, Rfl: 3    triamcinolone acetonide 0.1% (KENALOG) 0.1 % cream, Apply twice daily to milder rashes only as needed for 3 wks or less., Disp: 453 g, Rfl: 1    urea (CARMOL) 40 % Crea, , Disp: , Rfl:     atorvastatin (LIPITOR) 20 MG tablet, Take 2 tablets (40 mg total) by mouth once daily., Disp: 180 tablet, Rfl: 3    cetirizine (ZYRTEC) 10 MG tablet, Take 1 tablet (10 mg total) by mouth once daily., Disp: 90 tablet, Rfl: 3    clobetasol 0.05% (TEMOVATE) 0.05 % Oint, Apply twice a day to affected areas for no more than 2-3 wks, Disp: 60 g, Rfl: 2    EPINEPHrine (EPIPEN 2-MARCIA) 0.3 mg/0.3 mL AtIn, Inject contents of one syringe into muscle at first sign of severe allergic reaction., Disp: 1 each, Rfl: 2    famotidine (PEPCID) 20 MG tablet, Take 1 tablet (20 mg total) by mouth 2 (two) times daily., Disp: 180 tablet, Rfl: 3    glipiZIDE (GLUCOTROL) 10 MG TR24, Take 1 tablet (10 mg total) by mouth daily with breakfast., Disp: 90 tablet, Rfl: 3    nitrofurantoin, macrocrystal-monohydrate, (MACROBID) 100 MG capsule, Take 1 capsule (100 mg total) by mouth 2 (two) times daily. for 5 days, Disp: 10 capsule, Rfl: 0    semaglutide (OZEMPIC) 2 mg/dose (8 mg/3 mL) PnIj, Inject 2 mg into the skin every 7 days., Disp: 9 mL, Rfl: 3    Review of Systems   Constitutional:  Positive for fatigue. Negative for weight loss.   Eyes:  Positive for blurred vision.   Cardiovascular:  Negative for chest pain.   Endocrine: Positive for polyphagia. Negative for polydipsia and polyuria.   Skin:  Negative for pallor.   Neurological:  " Positive for weakness and headaches. Negative for dizziness, tremors, seizures and speech difficulty.   Psychiatric/Behavioral:  Negative for confusion. The patient is nervous/anxious.           Objective:      Vitals:    01/02/24 1355   BP: 128/70   Pulse: 88   Resp: 20   Temp: 97.9 °F (36.6 °C)   TempSrc: Oral   Weight: 85.4 kg (188 lb 4.8 oz)   Height: 5' 3" (1.6 m)     Physical Exam  Constitutional:       Appearance: Normal appearance.   HENT:      Mouth/Throat:      Mouth: Mucous membranes are moist.   Cardiovascular:      Rate and Rhythm: Normal rate and regular rhythm.      Pulses: Normal pulses.      Heart sounds: Normal heart sounds.   Pulmonary:      Breath sounds: Normal breath sounds.   Abdominal:      Palpations: Abdomen is soft.   Musculoskeletal:      Right lower leg: No edema.      Left lower leg: No edema.   Skin:     General: Skin is warm.   Neurological:      Mental Status: She is alert and oriented to person, place, and time. Mental status is at baseline.   Psychiatric:         Behavior: Behavior normal.           Assessment:       1. Dysuria    2. Rash and nonspecific skin eruption    3. Angioedema, initial encounter    4. Type 2 diabetes mellitus without complication, without long-term current use of insulin    5. Dyslipidemia    6. Allergic reaction, subsequent encounter           Plan:       Dysuria  -     Cancel: POCT URINE DIPSTICK WITH MICROSCOPE, AUTOMATED  -     nitrofurantoin, macrocrystal-monohydrate, (MACROBID) 100 MG capsule; Take 1 capsule (100 mg total) by mouth 2 (two) times daily. for 5 days  Dispense: 10 capsule; Refill: 0  -     POCT Urinalysis, Dipstick, Automated, W/O Scope    Rash and nonspecific skin eruption  -     Discontinue: cetirizine (ZYRTEC) 10 MG tablet; Take 1 tablet (10 mg total) by mouth once daily.  Dispense: 90 tablet; Refill: 3  -     Discontinue: clobetasol 0.05% (TEMOVATE) 0.05 % Oint; Apply twice a day to affected areas for no more than 2-3 wks  Dispense: " 60 g; Refill: 2  -     famotidine (PEPCID) 20 MG tablet; Take 1 tablet (20 mg total) by mouth 2 (two) times daily.  Dispense: 180 tablet; Refill: 3  -     cetirizine (ZYRTEC) 10 MG tablet; Take 1 tablet (10 mg total) by mouth once daily.  Dispense: 90 tablet; Refill: 3  -     clobetasol 0.05% (TEMOVATE) 0.05 % Oint; Apply twice a day to affected areas for no more than 2-3 wks  Dispense: 60 g; Refill: 2    Angioedema, initial encounter  -     Discontinue: cetirizine (ZYRTEC) 10 MG tablet; Take 1 tablet (10 mg total) by mouth once daily.  Dispense: 90 tablet; Refill: 3  -     famotidine (PEPCID) 20 MG tablet; Take 1 tablet (20 mg total) by mouth 2 (two) times daily.  Dispense: 180 tablet; Refill: 3  -     cetirizine (ZYRTEC) 10 MG tablet; Take 1 tablet (10 mg total) by mouth once daily.  Dispense: 90 tablet; Refill: 3    Type 2 diabetes mellitus without complication, without long-term current use of insulin  -     Discontinue: semaglutide (OZEMPIC) 2 mg/dose (8 mg/3 mL) PnIj; Inject 2 mg into the skin every 7 days.  Dispense: 9 mL; Refill: 3  -     Discontinue: glipiZIDE (GLUCOTROL) 10 MG TR24; Take 1 tablet (10 mg total) by mouth daily with breakfast.  Dispense: 90 tablet; Refill: 3  -     Discontinue: atorvastatin (LIPITOR) 20 MG tablet; Take 2 tablets (40 mg total) by mouth once daily.  Dispense: 180 tablet; Refill: 3  -     COMPREHENSIVE METABOLIC PANEL; Future; Expected date: 01/02/2024  -     HEMOGLOBIN A1C; Future; Expected date: 01/02/2024  -     atorvastatin (LIPITOR) 20 MG tablet; Take 2 tablets (40 mg total) by mouth once daily.  Dispense: 180 tablet; Refill: 3  -     glipiZIDE (GLUCOTROL) 10 MG TR24; Take 1 tablet (10 mg total) by mouth daily with breakfast.  Dispense: 90 tablet; Refill: 3  -     semaglutide (OZEMPIC) 2 mg/dose (8 mg/3 mL) PnIj; Inject 2 mg into the skin every 7 days.  Dispense: 9 mL; Refill: 3    Dyslipidemia  -     Discontinue: atorvastatin (LIPITOR) 20 MG tablet; Take 2 tablets (40 mg  total) by mouth once daily.  Dispense: 180 tablet; Refill: 3  -     atorvastatin (LIPITOR) 20 MG tablet; Take 2 tablets (40 mg total) by mouth once daily.  Dispense: 180 tablet; Refill: 3    Allergic reaction, subsequent encounter  -     EPINEPHrine (EPIPEN 2-MARCIA) 0.3 mg/0.3 mL AtIn; Inject contents of one syringe into muscle at first sign of severe allergic reaction.  Dispense: 1 each; Refill: 2      Follow up in about 6 months (around 7/2/2024), or if symptoms worsen or fail to improve.        1/2/2024 Tracey Zapata, NP

## 2024-01-03 ENCOUNTER — TELEPHONE (OUTPATIENT)
Dept: FAMILY MEDICINE | Facility: CLINIC | Age: 69
End: 2024-01-03
Payer: MEDICARE

## 2024-01-03 NOTE — TELEPHONE ENCOUNTER
Pt called stating she completed bldwk yesterday and requesting to discontinue Ozempic to Mounjaro. Pt states she heard great reviews about meds. KM

## 2024-01-04 LAB
ALBUMIN SERPL-MCNC: 4.4 G/DL (ref 3.9–4.9)
ALBUMIN/GLOB SERPL: 1.6 {RATIO} (ref 1.2–2.2)
ALP SERPL-CCNC: 94 IU/L (ref 44–121)
ALT SERPL-CCNC: 21 IU/L (ref 0–32)
AST SERPL-CCNC: 18 IU/L (ref 0–40)
BILIRUB SERPL-MCNC: 0.4 MG/DL (ref 0–1.2)
BUN SERPL-MCNC: 13 MG/DL (ref 8–27)
BUN/CREAT SERPL: 15 (ref 12–28)
CALCIUM SERPL-MCNC: 9.4 MG/DL (ref 8.7–10.3)
CHLORIDE SERPL-SCNC: 103 MMOL/L (ref 96–106)
CO2 SERPL-SCNC: 25 MMOL/L (ref 20–29)
CREAT SERPL-MCNC: 0.86 MG/DL (ref 0.57–1)
EST. GFR  (NO RACE VARIABLE): 74 ML/MIN/1.73
GLOBULIN SER CALC-MCNC: 2.7 G/DL (ref 1.5–4.5)
GLUCOSE SERPL-MCNC: 129 MG/DL (ref 70–99)
HBA1C MFR BLD: 6.3 % (ref 4.8–5.6)
POTASSIUM SERPL-SCNC: 4.4 MMOL/L (ref 3.5–5.2)
PROT SERPL-MCNC: 7.1 G/DL (ref 6–8.5)
SODIUM SERPL-SCNC: 142 MMOL/L (ref 134–144)

## 2024-01-05 NOTE — TELEPHONE ENCOUNTER
Pt notified Rx Mounjaro will not be covered under insurance due to improved A1c and to continue current med as prescribed. Pt satisfied with information given. KM

## 2024-01-09 ENCOUNTER — PATIENT OUTREACH (OUTPATIENT)
Dept: ADMINISTRATIVE | Facility: HOSPITAL | Age: 69
End: 2024-01-09
Payer: MEDICARE

## 2024-01-09 LAB
LEFT EYE DM RETINOPATHY: NEGATIVE
RIGHT EYE DM RETINOPATHY: NEGATIVE

## 2024-01-09 NOTE — PROGRESS NOTES
Population Health Chart Review & Patient Outreach Details    Outreach Performed: NO    Additional Pop Health Notes:           Updates Requested / Reviewed:      Updated Care Coordination Note         Health Maintenance Topics Overdue:    Health Maintenance Due   Topic Date Due    TETANUS VACCINE  Never done    Colorectal Cancer Screening  Never done    RSV Vaccine (Age 60+ and Pregnant patients) (1 - 1-dose 60+ series) Never done    Pneumococcal Vaccines (Age 65+) (2 - PCV) 02/08/2021    Shingles Vaccine (2 of 2) 03/21/2021    Foot Exam  04/19/2022    Influenza Vaccine (1) 09/01/2023    COVID-19 Vaccine (4 - 2023-24 season) 09/01/2023         Health Maintenance Topic(s) Outreach Outcomes & Actions Taken:    Eye Exam - Outreach Outcomes & Actions Taken  : Diabetic Eye External Records Uploaded, Care Team & History Updated if Applicable

## 2024-01-10 ENCOUNTER — PATIENT OUTREACH (OUTPATIENT)
Dept: ADMINISTRATIVE | Facility: HOSPITAL | Age: 69
End: 2024-01-10
Payer: MEDICARE

## 2024-01-10 NOTE — PROGRESS NOTES
Population Health Chart Review & Patient Outreach Details    Outreach Performed: NO    Additional Pop Health Notes:           Updates Requested / Reviewed:      Care Team Updated         Health Maintenance Topics Overdue:    Health Maintenance Due   Topic Date Due    TETANUS VACCINE  Never done    Colorectal Cancer Screening  Never done    RSV Vaccine (Age 60+ and Pregnant patients) (1 - 1-dose 60+ series) Never done    Pneumococcal Vaccines (Age 65+) (2 - PCV) 02/08/2021    Shingles Vaccine (2 of 2) 03/21/2021    Foot Exam  04/19/2022    Influenza Vaccine (1) 09/01/2023    COVID-19 Vaccine (4 - 2023-24 season) 09/01/2023         Health Maintenance Topic(s) Outreach Outcomes & Actions Taken:    Eye Exam - Outreach Outcomes & Actions Taken  : CARE TEAM UPDATED WITH EYE CARE PROVIDER

## 2024-01-11 DIAGNOSIS — Z00.00 ENCOUNTER FOR MEDICARE ANNUAL WELLNESS EXAM: ICD-10-CM

## 2024-01-19 ENCOUNTER — OFFICE VISIT (OUTPATIENT)
Dept: URGENT CARE | Facility: CLINIC | Age: 69
End: 2024-01-19
Payer: MEDICARE

## 2024-01-19 VITALS
BODY MASS INDEX: 33.31 KG/M2 | HEART RATE: 72 BPM | RESPIRATION RATE: 16 BRPM | DIASTOLIC BLOOD PRESSURE: 87 MMHG | WEIGHT: 188 LBS | TEMPERATURE: 98 F | SYSTOLIC BLOOD PRESSURE: 136 MMHG | OXYGEN SATURATION: 97 % | HEIGHT: 63 IN

## 2024-01-19 DIAGNOSIS — Z20.822 COVID-19 VIRUS NOT DETECTED: ICD-10-CM

## 2024-01-19 DIAGNOSIS — J02.9 SORE THROAT: ICD-10-CM

## 2024-01-19 DIAGNOSIS — R89.4 INFLUENZA A VIRUS NOT DETECTED: ICD-10-CM

## 2024-01-19 DIAGNOSIS — J06.9 VIRAL URI WITH COUGH: Primary | ICD-10-CM

## 2024-01-19 LAB
CTP QC/QA: YES
FLUAV AG NPH QL: NEGATIVE
FLUBV AG NPH QL: NEGATIVE
S PYO RRNA THROAT QL PROBE: NEGATIVE
SARS-COV-2 AG RESP QL IA.RAPID: NEGATIVE

## 2024-01-19 PROCEDURE — 87811 SARS-COV-2 COVID19 W/OPTIC: CPT | Mod: QW,S$GLB,,

## 2024-01-19 PROCEDURE — 99214 OFFICE O/P EST MOD 30 MIN: CPT | Mod: S$GLB,,,

## 2024-01-19 PROCEDURE — 87880 STREP A ASSAY W/OPTIC: CPT | Mod: QW,,,

## 2024-01-19 PROCEDURE — 87804 INFLUENZA ASSAY W/OPTIC: CPT | Mod: 59,QW,,

## 2024-01-19 RX ORDER — ALBUTEROL SULFATE 90 UG/1
2 AEROSOL, METERED RESPIRATORY (INHALATION) EVERY 6 HOURS PRN
Qty: 6.7 G | Refills: 0 | Status: SHIPPED | OUTPATIENT
Start: 2024-01-19 | End: 2024-01-19

## 2024-01-19 RX ORDER — MONTELUKAST SODIUM 10 MG/1
10 TABLET ORAL NIGHTLY
Qty: 30 TABLET | Refills: 0 | Status: SHIPPED | OUTPATIENT
Start: 2024-01-19 | End: 2024-02-18

## 2024-01-19 RX ORDER — MONTELUKAST SODIUM 10 MG/1
10 TABLET ORAL NIGHTLY
Qty: 30 TABLET | Refills: 0 | Status: SHIPPED | OUTPATIENT
Start: 2024-01-19 | End: 2024-01-19

## 2024-01-19 RX ORDER — FLUTICASONE PROPIONATE 50 MCG
1 SPRAY, SUSPENSION (ML) NASAL DAILY
Qty: 11.1 ML | Refills: 0 | Status: SHIPPED | OUTPATIENT
Start: 2024-01-19 | End: 2024-02-18

## 2024-01-19 RX ORDER — BENZONATATE 100 MG/1
100 CAPSULE ORAL 3 TIMES DAILY PRN
Qty: 30 CAPSULE | Refills: 0 | Status: SHIPPED | OUTPATIENT
Start: 2024-01-19 | End: 2024-01-19

## 2024-01-19 RX ORDER — ALBUTEROL SULFATE 90 UG/1
2 AEROSOL, METERED RESPIRATORY (INHALATION) EVERY 6 HOURS PRN
Qty: 6.7 G | Refills: 0 | Status: SHIPPED | OUTPATIENT
Start: 2024-01-19 | End: 2024-02-18

## 2024-01-19 RX ORDER — FLUTICASONE PROPIONATE 50 MCG
1 SPRAY, SUSPENSION (ML) NASAL DAILY
Qty: 11.1 ML | Refills: 0 | Status: SHIPPED | OUTPATIENT
Start: 2024-01-19 | End: 2024-01-19

## 2024-01-19 RX ORDER — BENZONATATE 100 MG/1
100 CAPSULE ORAL 3 TIMES DAILY PRN
Qty: 30 CAPSULE | Refills: 0 | Status: SHIPPED | OUTPATIENT
Start: 2024-01-19 | End: 2024-01-29

## 2024-01-19 NOTE — PROGRESS NOTES
"Subjective:      Patient ID: Lidya Benoit is a 68 y.o. female.    Vitals:  height is 5' 3" (1.6 m) and weight is 85.3 kg (188 lb). Her oral temperature is 98.4 °F (36.9 °C). Her blood pressure is 136/87 and her pulse is 72. Her respiration is 16 and oxygen saturation is 97%.     Chief Complaint: Sore Throat (/)    Patient recently returned [place] after driving from [place] to [facility] for potential freeze.  Yesterday, patient began having headache, body aches, congestion, bilateral otalgia, with right ear clear fluid drainage, productive cough of yellow phlegm, and sore throat.  She is taken Advil for her symptoms.  Spouse at home also ill with similar illness.    Sore Throat   This is a new problem. The current episode started yesterday. There has been no fever. The pain is at a severity of 6/10. The pain is moderate. Associated symptoms include congestion, coughing, ear discharge, ear pain, headaches and trouble swallowing. Pertinent negatives include no diarrhea or vomiting. She has had no exposure to strep or mono. She has tried NSAIDs for the symptoms. The treatment provided no relief.       Constitution: Negative.   HENT:  Positive for ear pain, ear discharge, congestion, sore throat and trouble swallowing.    Neck: Negative for neck stiffness.   Cardiovascular: Negative.    Eyes: Negative.    Respiratory:  Positive for cough and sputum production.    Gastrointestinal:  Negative for nausea, vomiting and diarrhea.   Endocrine: negative.   Genitourinary: Negative.    Musculoskeletal:  Positive for muscle ache.   Skin: Negative.    Allergic/Immunologic: Positive for immunizations up-to-date. Negative for flu shot.   Neurological:  Positive for headaches.   Hematologic/Lymphatic: Negative.    Psychiatric/Behavioral: Negative.        Objective:     Physical Exam   Constitutional: She is oriented to person, place, and time. She appears well-developed. She is cooperative.   HENT:   Head: Normocephalic and " atraumatic.   Ears:   Right Ear: Hearing, tympanic membrane, external ear and ear canal normal.   Left Ear: Hearing, tympanic membrane, external ear and ear canal normal.   Nose: Nose normal. No mucosal edema or nasal deformity. No epistaxis. Right sinus exhibits no maxillary sinus tenderness and no frontal sinus tenderness. Left sinus exhibits no maxillary sinus tenderness and no frontal sinus tenderness.   Mouth/Throat: Uvula is midline, oropharynx is clear and moist and mucous membranes are normal. Mucous membranes are moist. No trismus in the jaw. Normal dentition. No uvula swelling. Oropharynx is clear.   Eyes: Conjunctivae and lids are normal. Pupils are equal, round, and reactive to light. Extraocular movement intact   Neck: Trachea normal and phonation normal. Neck supple.   Cardiovascular: Normal rate, regular rhythm, normal heart sounds and normal pulses.   Pulmonary/Chest: Effort normal and breath sounds normal.   Abdominal: Normal appearance. Soft. flat abdomen   Musculoskeletal: Normal range of motion.         General: Normal range of motion.      Right lower le+ Pitting Edema present.      Left lower le+ Pitting Edema present.   Neurological: no focal deficit. She is alert, oriented to person, place, and time and at baseline. She exhibits normal muscle tone.   Skin: Skin is warm, dry and intact.   Psychiatric: Her speech is normal and behavior is normal. Mood, judgment and thought content normal.   Nursing note and vitals reviewed.      Assessment:     1. Viral URI with cough    2. Sore throat    3. Influenza A virus not detected    4. COVID-19 virus not detected        Plan:       Viral URI with cough  -     benzonatate (TESSALON) 100 MG capsule; Take 1 capsule (100 mg total) by mouth 3 (three) times daily as needed for Cough.  Dispense: 30 capsule; Refill: 0  -     albuterol (PROVENTIL HFA) 90 mcg/actuation inhaler; Inhale 2 puffs into the lungs every 6 (six) hours as needed for Wheezing.  Rescue  Dispense: 6.7 g; Refill: 0  -     fluticasone propionate (FLONASE) 50 mcg/actuation nasal spray; 1 spray (50 mcg total) by Each Nostril route once daily.  Dispense: 11.1 mL; Refill: 0  -     montelukast (SINGULAIR) 10 mg tablet; Take 1 tablet (10 mg total) by mouth every evening.  Dispense: 30 tablet; Refill: 0    Sore throat  -     POCT Influenza A/B Rapid Antigen  -     SARS Coronavirus 2 Antigen, POCT Manual Read  -     POCT rapid strep A    Influenza A virus not detected    COVID-19 virus not detected

## 2024-01-19 NOTE — PATIENT INSTRUCTIONS
Try humidified air, warm saltwater gargles, and over-the-counter cough drops for the sore throat.  Make sure you take switch out your filters in your house as well air conditioner filter in your car if applicable.    Make sure you increase your water intake and stay well hydrated.  Take Motrin or Tylenol for fevers and body aches.    Follow up with PCP if illness exceeds 10 days since onset.

## 2024-03-12 ENCOUNTER — PATIENT MESSAGE (OUTPATIENT)
Dept: ADMINISTRATIVE | Facility: HOSPITAL | Age: 69
End: 2024-03-12
Payer: MEDICARE

## 2025-01-25 ENCOUNTER — HOSPITAL ENCOUNTER (EMERGENCY)
Facility: HOSPITAL | Age: 70
Discharge: HOME | End: 2025-01-25
Attending: EMERGENCY MEDICINE
Payer: MEDICARE

## 2025-01-25 VITALS
TEMPERATURE: 99 F | WEIGHT: 179 LBS | SYSTOLIC BLOOD PRESSURE: 105 MMHG | HEIGHT: 63 IN | BODY MASS INDEX: 31.71 KG/M2 | DIASTOLIC BLOOD PRESSURE: 54 MMHG | OXYGEN SATURATION: 100 % | RESPIRATION RATE: 20 BRPM | HEART RATE: 79 BPM

## 2025-01-25 DIAGNOSIS — K52.9 GASTROENTERITIS: Primary | ICD-10-CM

## 2025-01-25 DIAGNOSIS — M79.10 MYALGIA: ICD-10-CM

## 2025-01-25 DIAGNOSIS — E86.0 DEHYDRATION: ICD-10-CM

## 2025-01-25 LAB
ALBUMIN SERPL BCP-MCNC: 4.4 G/DL (ref 3.5–5.2)
ALP SERPL-CCNC: 73 U/L (ref 55–135)
ALT SERPL W/O P-5'-P-CCNC: 13 U/L (ref 10–44)
AMPHET+METHAMPHET UR QL: NEGATIVE
ANION GAP SERPL CALC-SCNC: 12 MMOL/L (ref 8–16)
AST SERPL-CCNC: 16 U/L (ref 10–40)
BACTERIA #/AREA URNS HPF: NORMAL /HPF
BARBITURATES UR QL SCN>200 NG/ML: NEGATIVE
BASOPHILS # BLD AUTO: 0.02 K/UL (ref 0–0.2)
BASOPHILS NFR BLD: 0.2 % (ref 0–1.9)
BENZODIAZ UR QL SCN>200 NG/ML: NEGATIVE
BILIRUB SERPL-MCNC: 0.7 MG/DL (ref 0.1–1)
BILIRUB UR QL STRIP: NEGATIVE
BNP SERPL-MCNC: 35 PG/ML (ref 0–99)
BUN SERPL-MCNC: 15 MG/DL (ref 8–23)
BZE UR QL SCN: NEGATIVE
CALCIUM SERPL-MCNC: 9.7 MG/DL (ref 8.7–10.5)
CANNABINOIDS UR QL SCN: NEGATIVE
CHLORIDE SERPL-SCNC: 107 MMOL/L (ref 95–110)
CK SERPL-CCNC: 62 U/L (ref 20–180)
CLARITY UR: CLEAR
CO2 SERPL-SCNC: 22 MMOL/L (ref 23–29)
COLOR UR: YELLOW
CREAT SERPL-MCNC: 0.9 MG/DL (ref 0.5–1.4)
CREAT UR-MCNC: 234.9 MG/DL (ref 15–325)
DIFFERENTIAL METHOD BLD: ABNORMAL
EOSINOPHIL # BLD AUTO: 0.3 K/UL (ref 0–0.5)
EOSINOPHIL NFR BLD: 2.7 % (ref 0–8)
ERYTHROCYTE [DISTWIDTH] IN BLOOD BY AUTOMATED COUNT: 13.1 % (ref 11.5–14.5)
EST. GFR  (NO RACE VARIABLE): >60 ML/MIN/1.73 M^2
GLUCOSE SERPL-MCNC: 138 MG/DL (ref 70–110)
GLUCOSE UR QL STRIP: NEGATIVE
GROUP A STREP, MOLECULAR: NEGATIVE
HCT VFR BLD AUTO: 41.2 % (ref 37–48.5)
HCV AB SERPL QL IA: NEGATIVE
HGB BLD-MCNC: 13.4 G/DL (ref 12–16)
HGB UR QL STRIP: NEGATIVE
HIV 1+2 AB+HIV1 P24 AG SERPL QL IA: NEGATIVE
IMM GRANULOCYTES # BLD AUTO: 0.04 K/UL (ref 0–0.04)
IMM GRANULOCYTES NFR BLD AUTO: 0.3 % (ref 0–0.5)
INFLUENZA A, MOLECULAR: NEGATIVE
INFLUENZA B, MOLECULAR: NEGATIVE
KETONES UR QL STRIP: NEGATIVE
LDH SERPL L TO P-CCNC: 1.41 MMOL/L (ref 0.5–2.2)
LEUKOCYTE ESTERASE UR QL STRIP: ABNORMAL
LIPASE SERPL-CCNC: 34 U/L (ref 4–60)
LYMPHOCYTES # BLD AUTO: 1 K/UL (ref 1–4.8)
LYMPHOCYTES NFR BLD: 8.6 % (ref 18–48)
MAGNESIUM SERPL-MCNC: 1.8 MG/DL (ref 1.6–2.6)
MCH RBC QN AUTO: 31.2 PG (ref 27–31)
MCHC RBC AUTO-ENTMCNC: 32.5 G/DL (ref 32–36)
MCV RBC AUTO: 96 FL (ref 82–98)
MICROSCOPIC COMMENT: NORMAL
MONOCYTES # BLD AUTO: 0.7 K/UL (ref 0.3–1)
MONOCYTES NFR BLD: 5.9 % (ref 4–15)
NEUTROPHILS # BLD AUTO: 9.4 K/UL (ref 1.8–7.7)
NEUTROPHILS NFR BLD: 82.3 % (ref 38–73)
NITRITE UR QL STRIP: NEGATIVE
NRBC BLD-RTO: 0 /100 WBC
OPIATES UR QL SCN: NEGATIVE
PCP UR QL SCN>25 NG/ML: NEGATIVE
PH UR STRIP: 6 [PH] (ref 5–8)
PLATELET # BLD AUTO: 236 K/UL (ref 150–450)
PMV BLD AUTO: 10.9 FL (ref 9.2–12.9)
POTASSIUM SERPL-SCNC: 4.2 MMOL/L (ref 3.5–5.1)
PROT SERPL-MCNC: 7.6 G/DL (ref 6–8.4)
PROT UR QL STRIP: ABNORMAL
RBC # BLD AUTO: 4.29 M/UL (ref 4–5.4)
RBC #/AREA URNS HPF: 0 /HPF (ref 0–4)
RSV AG SPEC QL IA: NEGATIVE
SAMPLE: NORMAL
SARS-COV-2 RDRP RESP QL NAA+PROBE: NEGATIVE
SODIUM SERPL-SCNC: 141 MMOL/L (ref 136–145)
SP GR UR STRIP: 1.02 (ref 1–1.03)
SPECIMEN SOURCE: NORMAL
SPECIMEN SOURCE: NORMAL
SQUAMOUS #/AREA URNS HPF: 4 /HPF
TOXICOLOGY INFORMATION: NORMAL
TROPONIN I SERPL HS-MCNC: 2.5 PG/ML (ref 0–14.9)
TROPONIN I SERPL HS-MCNC: 3 PG/ML (ref 0–14.9)
TSH SERPL DL<=0.005 MIU/L-ACNC: 0.95 UIU/ML (ref 0.34–5.6)
URN SPEC COLLECT METH UR: ABNORMAL
UROBILINOGEN UR STRIP-ACNC: NEGATIVE EU/DL
WBC # BLD AUTO: 11.48 K/UL (ref 3.9–12.7)
WBC #/AREA URNS HPF: 3 /HPF (ref 0–5)

## 2025-01-25 PROCEDURE — 82550 ASSAY OF CK (CPK): CPT | Performed by: EMERGENCY MEDICINE

## 2025-01-25 PROCEDURE — 87389 HIV-1 AG W/HIV-1&-2 AB AG IA: CPT | Performed by: EMERGENCY MEDICINE

## 2025-01-25 PROCEDURE — 99285 EMERGENCY DEPT VISIT HI MDM: CPT | Mod: 25

## 2025-01-25 PROCEDURE — 83880 ASSAY OF NATRIURETIC PEPTIDE: CPT | Performed by: EMERGENCY MEDICINE

## 2025-01-25 PROCEDURE — 87651 STREP A DNA AMP PROBE: CPT | Performed by: EMERGENCY MEDICINE

## 2025-01-25 PROCEDURE — 84443 ASSAY THYROID STIM HORMONE: CPT | Performed by: EMERGENCY MEDICINE

## 2025-01-25 PROCEDURE — 96375 TX/PRO/DX INJ NEW DRUG ADDON: CPT

## 2025-01-25 PROCEDURE — 83735 ASSAY OF MAGNESIUM: CPT | Performed by: EMERGENCY MEDICINE

## 2025-01-25 PROCEDURE — 96361 HYDRATE IV INFUSION ADD-ON: CPT

## 2025-01-25 PROCEDURE — 96376 TX/PRO/DX INJ SAME DRUG ADON: CPT

## 2025-01-25 PROCEDURE — 25000003 PHARM REV CODE 250: Performed by: EMERGENCY MEDICINE

## 2025-01-25 PROCEDURE — 86803 HEPATITIS C AB TEST: CPT | Performed by: EMERGENCY MEDICINE

## 2025-01-25 PROCEDURE — 87635 SARS-COV-2 COVID-19 AMP PRB: CPT | Performed by: EMERGENCY MEDICINE

## 2025-01-25 PROCEDURE — 63600175 PHARM REV CODE 636 W HCPCS: Performed by: EMERGENCY MEDICINE

## 2025-01-25 PROCEDURE — 85025 COMPLETE CBC W/AUTO DIFF WBC: CPT | Performed by: EMERGENCY MEDICINE

## 2025-01-25 PROCEDURE — 84484 ASSAY OF TROPONIN QUANT: CPT | Mod: 91 | Performed by: EMERGENCY MEDICINE

## 2025-01-25 PROCEDURE — 80053 COMPREHEN METABOLIC PANEL: CPT | Performed by: EMERGENCY MEDICINE

## 2025-01-25 PROCEDURE — 93005 ELECTROCARDIOGRAM TRACING: CPT | Performed by: INTERNAL MEDICINE

## 2025-01-25 PROCEDURE — 83690 ASSAY OF LIPASE: CPT | Performed by: EMERGENCY MEDICINE

## 2025-01-25 PROCEDURE — 96374 THER/PROPH/DIAG INJ IV PUSH: CPT

## 2025-01-25 PROCEDURE — 80307 DRUG TEST PRSMV CHEM ANLYZR: CPT | Performed by: EMERGENCY MEDICINE

## 2025-01-25 PROCEDURE — 81001 URINALYSIS AUTO W/SCOPE: CPT | Performed by: EMERGENCY MEDICINE

## 2025-01-25 PROCEDURE — 87634 RSV DNA/RNA AMP PROBE: CPT | Performed by: EMERGENCY MEDICINE

## 2025-01-25 PROCEDURE — 87502 INFLUENZA DNA AMP PROBE: CPT | Performed by: EMERGENCY MEDICINE

## 2025-01-25 RX ORDER — AMOXICILLIN AND CLAVULANATE POTASSIUM 875; 125 MG/1; MG/1
1 TABLET, FILM COATED ORAL 2 TIMES DAILY
Qty: 20 TABLET | Refills: 0 | Status: SHIPPED | OUTPATIENT
Start: 2025-01-25 | End: 2025-02-04

## 2025-01-25 RX ORDER — ACETAMINOPHEN 500 MG
1000 TABLET ORAL
Status: COMPLETED | OUTPATIENT
Start: 2025-01-25 | End: 2025-01-25

## 2025-01-25 RX ORDER — ONDANSETRON 4 MG/1
4 TABLET, ORALLY DISINTEGRATING ORAL EVERY 6 HOURS PRN
Qty: 9 TABLET | Refills: 0 | Status: SHIPPED | OUTPATIENT
Start: 2025-01-25

## 2025-01-25 RX ORDER — PANTOPRAZOLE SODIUM 40 MG/10ML
40 INJECTION, POWDER, LYOPHILIZED, FOR SOLUTION INTRAVENOUS ONCE
Status: COMPLETED | OUTPATIENT
Start: 2025-01-25 | End: 2025-01-25

## 2025-01-25 RX ORDER — ONDANSETRON HYDROCHLORIDE 2 MG/ML
4 INJECTION, SOLUTION INTRAVENOUS
Status: COMPLETED | OUTPATIENT
Start: 2025-01-25 | End: 2025-01-25

## 2025-01-25 RX ORDER — CEFTRIAXONE 1 G/1
1 INJECTION, POWDER, FOR SOLUTION INTRAMUSCULAR; INTRAVENOUS
Status: COMPLETED | OUTPATIENT
Start: 2025-01-25 | End: 2025-01-25

## 2025-01-25 RX ADMIN — CEFTRIAXONE 1 G: 1 INJECTION, POWDER, FOR SOLUTION INTRAMUSCULAR; INTRAVENOUS at 01:01

## 2025-01-25 RX ADMIN — ONDANSETRON 4 MG: 2 INJECTION INTRAMUSCULAR; INTRAVENOUS at 01:01

## 2025-01-25 RX ADMIN — ACETAMINOPHEN 1000 MG: 500 TABLET, FILM COATED ORAL at 10:01

## 2025-01-25 RX ADMIN — SODIUM CHLORIDE 1000 ML: 9 INJECTION, SOLUTION INTRAVENOUS at 11:01

## 2025-01-25 RX ADMIN — PANTOPRAZOLE SODIUM 40 MG: 40 INJECTION, POWDER, LYOPHILIZED, FOR SOLUTION INTRAVENOUS at 11:01

## 2025-01-25 RX ADMIN — ONDANSETRON 4 MG: 2 INJECTION INTRAMUSCULAR; INTRAVENOUS at 11:01

## 2025-01-25 NOTE — ED PROVIDER NOTES
Encounter Date: 1/25/2025       History     Chief Complaint   Patient presents with    Headache     Headache, chills, vomiting, and diarrhea x 3 weeks - vomiting usually happens after eating dinner, then wakes up in middle of night vomiting and diarrhea - denies abdominal pain/cramping    Chills    Vomiting    Diarrhea     69-year-old female presents complaining of abdominal pain nausea vomiting diarrhea headaches malaise and fatigue.  Symptoms have been going on for about 2-1/2 weeks.  She states that prior to this she did have clams at a buffet.  Denies fever but has had body aches and chills.  Headaches have been intermittent, frontal in nature, with no associated neck pain, neck stiffness, photophobia or any focal weakness, numbness tingling or paresthesias.  No severe sudden onset headaches at any point.  Abdominal pain has been intermittent and crampy.  Reports vomiting every day and this usually occurs about 4 hours after eating.  No melena hematochezia or any visualized gastrointestinal bleeding.  Has had 2-3 episodes of loose stool daily also with no evidence of gastrointestinal bleeding.  Has not been on any antibiotics or had out of the country travel within the past 2 months.  Is on Ozempic but it has been on it for 2-3 years and has had no recent dose adjustments or increases.  Reports generalized fatigue and malaise but denies any focal weakness numbness tingling or paresthesias.  Denies chest pain coughing or shortness of breath.  Denies sinus congestion earache or sore throat.  Symptoms are moderate in intensity with no exacerbating or alleviating factors.  Denies any other problems or complaints.        Review of patient's allergies indicates:   Allergen Reactions    Covid-19 vacc,mrna(pfizer)(pf) Swelling    Lemon balm (lidya officinalis) Rash    Lime Rash    Iodine and iodide containing products     Msg [glutamic acid]      Past Medical History:   Diagnosis Date    Diabetes mellitus, type 2      Lupus      History reviewed. No pertinent surgical history.  Family History   Problem Relation Name Age of Onset    Psoriasis Maternal Aunt      Melanoma Neg Hx      Lupus Neg Hx      Eczema Neg Hx       Social History     Tobacco Use    Smoking status: Never     Passive exposure: Never    Smokeless tobacco: Never   Substance Use Topics    Alcohol use: No    Drug use: No     Review of Systems   Constitutional:  Positive for activity change, appetite change, chills and fatigue. Negative for unexpected weight change.   HENT: Negative.     Gastrointestinal:  Positive for abdominal pain, nausea and vomiting.   Neurological:  Positive for headaches. Negative for dizziness, tremors, seizures, syncope, facial asymmetry, speech difficulty, weakness, light-headedness and numbness.   Psychiatric/Behavioral: Negative.         Physical Exam     Initial Vitals [01/25/25 0906]   BP Pulse Resp Temp SpO2   (!) 148/83 89 18 98.5 °F (36.9 °C) 98 %      MAP       --         Physical Exam    Nursing note and vitals reviewed.  Constitutional: She is cooperative. She does not appear ill. No distress.   HENT:   Head: Normocephalic and atraumatic.   Nose: Nose normal. Mouth/Throat: Uvula is midline, oropharynx is clear and moist and mucous membranes are normal. No oropharyngeal exudate, posterior oropharyngeal edema or posterior oropharyngeal erythema.   Eyes: Conjunctivae, EOM and lids are normal. Pupils are equal, round, and reactive to light. No scleral icterus.   Neck: Trachea normal and phonation normal. Neck supple. No stridor present. No JVD present.   Normal range of motion.   Full passive range of motion without pain.     Cardiovascular:  Normal rate, regular rhythm, normal heart sounds, intact distal pulses and normal pulses.     Exam reveals no gallop, no distant heart sounds, no friction rub and no decreased pulses.       No murmur heard.  Pulmonary/Chest: Effort normal and breath sounds normal. No respiratory distress. She has  no wheezes. She has no rhonchi. She has no rales.   Abdominal: Abdomen is soft and flat. Bowel sounds are normal. She exhibits abdominal bruit. She exhibits no distension, no pulsatile midline mass and no mass. There is abdominal tenderness in the right upper quadrant, epigastric area and left upper quadrant.   No right CVA tenderness.  No left CVA tenderness. There is no rigidity, no rebound, no guarding, no tenderness at McBurney's point and negative May's sign.   Musculoskeletal:         General: No tenderness or edema. Normal range of motion.      Right hand: Normal. Normal capillary refill. Normal pulse.      Left hand: Normal. Normal sensation. Normal capillary refill. Normal pulse.      Cervical back: Normal, full passive range of motion without pain, normal range of motion and neck supple. No bony tenderness. No pain with movement or spinous process tenderness. Normal range of motion.      Thoracic back: Normal. No tenderness. Normal range of motion.      Lumbar back: Normal. No tenderness. Normal range of motion.      Right lower leg: Normal. No swelling or tenderness.      Left lower leg: Normal. No swelling or tenderness.      Right foot: Normal. Normal capillary refill. Normal pulse.      Left foot: Normal. Normal capillary refill. Normal pulse.      Comments: Pulses are 2+ throughout, cap refill is less than 2 sec throughout, extremities are nontender throughout with full range of motion. There is no spinal tenderness to palpation.     Neurological: She is alert and oriented to person, place, and time. She has normal strength. No cranial nerve deficit or sensory deficit. Coordination and gait normal.   No focal deficits.   Skin: Skin is warm, dry and intact. Capillary refill takes less than 2 seconds. No ecchymosis, no petechiae and no rash noted. No erythema. No pallor.   Psychiatric: She has a normal mood and affect. Her speech is normal and behavior is normal.         ED Course   Procedures  Labs  Reviewed   CBC W/ AUTO DIFFERENTIAL - Abnormal       Result Value    WBC 11.48      RBC 4.29      Hemoglobin 13.4      Hematocrit 41.2      MCV 96      MCH 31.2 (*)     MCHC 32.5      RDW 13.1      Platelets 236      MPV 10.9      Immature Granulocytes 0.3      Gran # (ANC) 9.4 (*)     Immature Grans (Abs) 0.04      Lymph # 1.0      Mono # 0.7      Eos # 0.3      Baso # 0.02      nRBC 0      Gran % 82.3 (*)     Lymph % 8.6 (*)     Mono % 5.9      Eosinophil % 2.7      Basophil % 0.2      Differential Method Automated     COMPREHENSIVE METABOLIC PANEL - Abnormal    Sodium 141      Potassium 4.2      Chloride 107      CO2 22 (*)     Glucose 138 (*)     BUN 15      Creatinine 0.9      Calcium 9.7      Total Protein 7.6      Albumin 4.4      Total Bilirubin 0.7      Alkaline Phosphatase 73      AST 16      ALT 13      eGFR >60.0      Anion Gap 12     URINALYSIS, REFLEX TO URINE CULTURE - Abnormal    Specimen UA Urine, Clean Catch      Color, UA Yellow      Appearance, UA Clear      pH, UA 6.0      Specific Gravity, UA 1.025      Protein, UA Trace (*)     Glucose, UA Negative      Ketones, UA Negative      Bilirubin (UA) Negative      Occult Blood UA Negative      Nitrite, UA Negative      Urobilinogen, UA Negative      Leukocytes, UA 2+ (*)     Narrative:     Specimen Source->Urine  Specimen Source->Nasopharyngeal Swab   GROUP A STREP, MOLECULAR    Group A Strep, Molecular Negative     HEPATITIS C ANTIBODY    Hepatitis C Ab Negative      Narrative:     Release to patient->Immediate   HIV 1 / 2 ANTIBODY    HIV 1/2 Ag/Ab Negative      Narrative:     Release to patient->Immediate   MAGNESIUM    Magnesium 1.8     TROPONIN I HIGH SENSITIVITY    Troponin I High Sensitivity 2.5     B-TYPE NATRIURETIC PEPTIDE    BNP 35     LIPASE    Lipase 34     DRUG SCREEN PANEL, URINE EMERGENCY    Benzodiazepines Negative      Cocaine (Metab.) Negative      Opiate Scrn, Ur Negative      Barbiturate Screen, Ur Negative      Amphetamine  Screen, Ur Negative      THC Negative      Phencyclidine Negative      Creatinine, Urine 234.9      Toxicology Information SEE COMMENT      Narrative:     Specimen Source->Urine  Specimen Source->Nasopharyngeal Swab   CK    CPK 62     TSH    TSH 0.946     SARS-COV-2 RNA AMPLIFICATION, QUAL    SARS-CoV-2 RNA, Amplification, Qual Negative     INFLUENZA A AND B ANTIGEN    Influenza A, Molecular Negative      Influenza B, Molecular Negative      Flu A & B Source Nasal swab      Narrative:     Specimen Source->Nasopharyngeal Swab   RSV ANTIGEN DETECTION    RSV Source NP      RSV Ag by Molecular Method Negative      Narrative:     Specimen Source->Urine  Specimen Source->Nasopharyngeal Swab   TROPONIN I HIGH SENSITIVITY    Troponin I High Sensitivity 3.0     URINALYSIS MICROSCOPIC    RBC, UA 0      WBC, UA 3      Bacteria Occasional      Squam Epithel, UA 4      Microscopic Comment SEE COMMENT      Narrative:     Specimen Source->Urine  Specimen Source->Nasopharyngeal Swab   ISTAT LACTATE    POC Lactate 1.41      Sample VENOUS          ECG Results              EKG 12-lead (Final result)        Collection Time Result Time QRS Duration OHS QTC Calculation    01/25/25 10:29:07 01/31/25 14:41:37 80 406                     Final result by Interface, Lab In [facility] (01/31/25 14:41:42)                   Narrative:    Test Reason : M79.10,    Vent. Rate :  75 BPM     Atrial Rate :  75 BPM     P-R Int : 158 ms          QRS Dur :  80 ms      QT Int : 364 ms       P-R-T Axes :  70   0  54 degrees    QTcB Int : 406 ms    Normal sinus rhythm  Normal ECG  No previous ECGs available  Confirmed by Terrell Prince ([id number]) on 1/31/2025 2:41:31 PM    Referred By: [email or web address] SELF           Confirmed By: Terrell Prince                                     EKG 12-lead (Final result)  Result time 02/04/25 12:53:04      Final result by Unknown User (02/04/25 12:53:04)                                      Imaging Results              CT Abdomen  Pelvis  Without Contrast (Final result)  Result time 01/25/25 12:21:06      Final result by Patrick Rangel MD (01/25/25 12:21:06)                   Impression:      1. Nonobstructing right renal calculi.  2. Negative for hydronephrosis, urolithiasis, or other acute intra-abdominal abnormality.  3. Very mild hepatic steatosis.      Electronically signed by: Patrick Rangel  Date:    01/25/2025  Time:    12:21               Narrative:      EXAMINATION:    CT ABDOMEN PELVIS WITHOUT CONTRAST    CLINICAL HISTORY:  Epigastric pain;Nausea/vomiting;    TECHNIQUE:  CT abdomen and pelvis without IV or oral contrast. Study is tailored for detection of urinary tract calculi and evaluation of solid organs, hollow viscera, and vascular structures is limited.    COMPARISON:  None    FINDINGS:  CT ABDOMEN:    Visualized lung bases show minor dependent atelectasis.    Multifocal hepatic cysts are present.  Largest of these measures 9 cm in left hepatic lobe, while others are smaller and scattered throughout hepatic parenchyma.  Very mild hepatic steatosis is suggested.  Gallbladder, pancreas, spleen, and adrenals are normal.    Nonobstructing calculi lie in right mid and inferior renal calices.  Kidneys are otherwise normal, without hydronephrosis or ureteral calculi.  Aorta is normal.    No intestinal abnormality identified.  Appendix not identified but no secondary signs of appendicitis are present.  No free intraperitoneal gas.    Mild lumbar disc degeneration is present.  No acute osseous abnormality.    CT PELVIS:    Noncontrast uterus and adnexa are normal.  Bladder is normal.  No free pelvic fluid.  No acute osseous abnormality.                                       CT Head Without Contrast (Final result)  Result time 01/25/25 12:10:01      Final result by Patrick Rangel MD (01/25/25 12:10:01)                   Impression:      No acute intracranial abnormality.      Electronically signed by: Patrick  Juan Carlos  Date:    01/25/2025  Time:    12:10               Narrative:    EXAMINATION:  CT HEAD WITHOUT CONTRAST    CLINICAL HISTORY:  Headache, new or worsening (Age >= 50y);    TECHNIQUE:  Head CT without IV contrast.    COMPARISON:  None    FINDINGS:  Gray-white differentiation is maintained without hemorrhage, midline shift, or mass effect. Mild cerebral and cerebellar atrophy is present.    The ventricles and cisterns are maintained.    Calvarium is intact. Visualized sinuses are clear.                                       X-Ray Chest PA And Lateral (Final result)  Result time 01/25/25 10:14:56      Final result by Patrick Rangel MD (01/25/25 10:14:56)                   Impression:      Normal chest.      Electronically signed by: Patrick Rangel  Date:    01/25/2025  Time:    10:14               Narrative:    EXAMINATION:  XR CHEST PA AND LATERAL    CLINICAL HISTORY:  myalgia;    FINDINGS:  PA and lateral chest compared with 04/09/2014 shows normal cardiomediastinal silhouette.    Lungs are clear. Pulmonary vasculature is normal. No acute osseous abnormality.                                       Medications   acetaminophen tablet 1,000 mg (1,000 mg Oral Given 1/25/25 1013)   sodium chloride 0.9% bolus 1,000 mL 1,000 mL (0 mLs Intravenous Stopped 1/25/25 1242)   ondansetron injection 4 mg (4 mg Intravenous Given 1/25/25 1140)   pantoprazole injection 40 mg (40 mg Intravenous Given 1/25/25 1140)   cefTRIAXone injection 1 g (1 g Intravenous Given 1/25/25 1354)   ondansetron injection 4 mg (4 mg Intravenous Given 1/25/25 1354)     Medical Decision Making  Labs and diagnostic imaging review. Past medical history, medication and social history review. Patient with minimal epigastric tenderness with no rebound regarding. CT scan with no evidence of acute abdominal abnormality. Lactic acid not elevated. Patient feels better after ED care and treatment. Feels well and feels ready to go home. Symptomatic supportive care  has been discussed,Return precautions discuss  detail and strict importance a outpatient evaluation discussed. Patient feels comfortable with discharge planning.    Amount and/or Complexity of Data Reviewed  Labs: ordered. Decision-making details documented in ED Course.  Radiology: ordered.    Risk  OTC drugs.  Prescription drug management.               ED Course as of 02/20/25 2153   Sat Jan 25, 2025   1346 Troponin I High Sensitivity: 3.0 [AR]   1346 TSH: 0.946 [AR]   1346 Leukocyte Esterase, UA(!): 2+ [AR]   1346 UROBILINOGEN UA: Negative [AR]   1346 NITRITE UA: Negative [AR]   1346 Blood, UA: Negative [AR]   1346 Bilirubin (UA): Negative [AR]   1346 Ketones, UA: Negative [AR]   1346 Glucose, UA: Negative [AR]   1346 Protein, UA(!): Trace [AR]   1346 Spec Grav UA: 1.025 [AR]   Thu Feb 20, 2025 2150 Troponin I High Sensitivity: 3.0 [AR]   2150 Bacteria, UA: Occasional [AR]   2150 Squam Epithel, UA: 4 [AR]   2150 Leukocyte Esterase, UA(!): 2+ [AR]   2150 Influenza A, Molecular: Negative [AR]   2150 Influenza B, Molecular: Negative [AR]   2150 Flu A & B Source: Nasal swab [AR]   2150 Group A Strep, Molecular: Negative [AR]   2150 SARS-CoV-2 RNA, Amplification, Qual: Negative [AR]   2150 RSV Ag by Molecular Method: Negative [AR]   2150 CPK: 62 [AR]   2150 Magnesium : 1.8 [AR]   2150 WBC: 11.48 [AR]   2150 Hemoglobin: 13.4 [AR]   2150 Hematocrit: 41.2 [AR]   2150 Platelet Count: 236 [AR]      ED Course User Index  [AR] Jeanne Larson MD                           Clinical Impression:  Final diagnoses:  [M79.10] Myalgia  [K52.9] Gastroenteritis (Primary)  [E86.0] Dehydration          ED Disposition Condition    Discharge Stable          ED Prescriptions       Medication Sig Dispense Start Date End Date Auth. Provider    ondansetron (ZOFRAN-ODT) 4 MG TbDL Take 1 tablet (4 mg total) by mouth every 6 (six) hours as needed (nausea or vomiting). 9 tablet 1/25/2025 -- Jeanne Larson MD    amoxicillin-clavulanate  875-125mg (AUGMENTIN) 875-125 mg per tablet () Take 1 tablet by mouth 2 (two) times daily. for 10 days 20 tablet 2025 Jeanne Larson MD          Follow-up Information       Follow up With Specialties Details Why Contact Info    Your primary care doctor  In 2 days               Jeanne Larson MD  [phone]

## 2025-01-25 NOTE — DISCHARGE INSTRUCTIONS
Please read and follow discharge instructions and return precautions.  Rest, avoid any strenuous activity, over exertion or overheating.  Keep well hydrated.  Alternate important Pedialyte for hydration.  Zofran as directed if needed for nausea or vomiting.  Augmentin as directed.  Very important to follow up closely outpatient with your primary care provider as well as Gastroenterology.  You may benefit from an EGD and colonoscopy.  Do not take Ozempic at this time.  See your primary care provider or Gastroenterology prior to restarting the Ozempic.  Follow a clear liquid diet for the next 24 hours then advance to a bland diet as tolerated.  Return immediately if you develop new or worsening symptoms or if you have new problems or concerns.

## 2025-01-28 ENCOUNTER — TELEPHONE (OUTPATIENT)
Dept: GASTROENTEROLOGY | Facility: CLINIC | Age: 70
End: 2025-01-28
Payer: MEDICARE

## 2025-01-31 LAB
OHS QRS DURATION: 80 MS
OHS QTC CALCULATION: 406 MS